# Patient Record
Sex: MALE | Race: OTHER | HISPANIC OR LATINO | Employment: UNEMPLOYED | URBAN - METROPOLITAN AREA
[De-identification: names, ages, dates, MRNs, and addresses within clinical notes are randomized per-mention and may not be internally consistent; named-entity substitution may affect disease eponyms.]

---

## 2020-12-15 ENCOUNTER — TELEPHONE (OUTPATIENT)
Dept: FAMILY MEDICINE | Facility: CLINIC | Age: 65
End: 2020-12-15

## 2020-12-15 ENCOUNTER — OFFICE VISIT (OUTPATIENT)
Dept: FAMILY MEDICINE | Facility: CLINIC | Age: 65
End: 2020-12-15
Payer: MEDICARE

## 2020-12-15 VITALS
OXYGEN SATURATION: 98 % | WEIGHT: 166 LBS | HEIGHT: 66 IN | TEMPERATURE: 98 F | DIASTOLIC BLOOD PRESSURE: 80 MMHG | BODY MASS INDEX: 26.68 KG/M2 | HEART RATE: 83 BPM | SYSTOLIC BLOOD PRESSURE: 140 MMHG

## 2020-12-15 DIAGNOSIS — Z12.5 PROSTATE CANCER SCREENING: ICD-10-CM

## 2020-12-15 DIAGNOSIS — E03.9 ACQUIRED HYPOTHYROIDISM: ICD-10-CM

## 2020-12-15 DIAGNOSIS — M25.50 POLYARTHRALGIA: ICD-10-CM

## 2020-12-15 DIAGNOSIS — Z11.4 ENCOUNTER FOR SCREENING FOR HUMAN IMMUNODEFICIENCY VIRUS (HIV): ICD-10-CM

## 2020-12-15 DIAGNOSIS — E78.5 HYPERLIPIDEMIA, UNSPECIFIED HYPERLIPIDEMIA TYPE: ICD-10-CM

## 2020-12-15 DIAGNOSIS — Z00.00 ANNUAL PHYSICAL EXAM: Primary | ICD-10-CM

## 2020-12-15 DIAGNOSIS — Z11.59 ENCOUNTER FOR HEPATITIS C SCREENING TEST FOR LOW RISK PATIENT: ICD-10-CM

## 2020-12-15 DIAGNOSIS — K90.49 MALABSORPTION DUE TO INTOLERANCE, NOT ELSEWHERE CLASSIFIED: ICD-10-CM

## 2020-12-15 DIAGNOSIS — Z12.11 COLON CANCER SCREENING: ICD-10-CM

## 2020-12-15 PROCEDURE — 99999 PR PBB SHADOW E&M-NEW PATIENT-LVL IV: CPT | Mod: PBBFAC,,, | Performed by: INTERNAL MEDICINE

## 2020-12-15 PROCEDURE — 99999 PR PBB SHADOW E&M-NEW PATIENT-LVL IV: ICD-10-PCS | Mod: PBBFAC,,, | Performed by: INTERNAL MEDICINE

## 2020-12-15 PROCEDURE — 99204 OFFICE O/P NEW MOD 45 MIN: CPT | Mod: PBBFAC,PO | Performed by: INTERNAL MEDICINE

## 2020-12-15 PROCEDURE — 99204 PR OFFICE/OUTPT VISIT, NEW, LEVL IV, 45-59 MIN: ICD-10-PCS | Mod: S$PBB,,, | Performed by: INTERNAL MEDICINE

## 2020-12-15 PROCEDURE — 99204 OFFICE O/P NEW MOD 45 MIN: CPT | Mod: S$PBB,,, | Performed by: INTERNAL MEDICINE

## 2020-12-15 NOTE — PROGRESS NOTES
Subjective:       Patient ID: Shaka Hatch is a 65 y.o. male.    Chief Complaint: Establish Care      65-year-old man with history of hypercholesterolemia, hypothyroidism, and degenerative disc disease.  These problems were diagnosed in [facility] a couple of years ago and treatment was prescribed once and he understood that problems will be normalize and no longer needed to keep.  He is here to get established and have routine/annual medical evaluation.  Reports occasional back pains that are very short-lived and does not take anything for his pain.  Also notices frequent swelling of both antecubital and popliteal area with exercise, now normal.  Denies pain, weakness, redness, or tingling.  And also reports right knee shock-like pain on the lateral side of his knee are below-the-knee associated with some movements.  It just lasts seconds and does not take anything for the pain.  Denies swelling, weakness, or any other symptom.    He walks a few times a week for around 40 min without limitations.  Not really following a diet.  No toxic habits who but quit smoking 3 years ago.  Drinks 1 or 2 beers or wine 5 times a week.    Review of Systems   Constitutional: Negative for activity change, appetite change, chills, fatigue, fever and unexpected weight change.   HENT: Negative for nasal congestion, ear pain and sore throat.    Eyes: Negative for visual disturbance.   Respiratory: Negative for cough and shortness of breath.    Cardiovascular: Negative for chest pain, palpitations, leg swelling and claudication.   Gastrointestinal: Negative for abdominal pain, change in bowel habit, constipation, diarrhea, nausea, vomiting and change in bowel habit.   Endocrine: Negative for cold intolerance, heat intolerance, polyphagia and polyuria.   Genitourinary: Negative for bladder incontinence, difficulty urinating and dysuria.   Musculoskeletal: Positive for arthralgias (See above) and joint swelling (Reports antecubital and  popliteal area swelling with exercise.).   Neurological: Negative for dizziness, weakness, light-headedness, numbness and headaches.   Hematological: Does not bruise/bleed easily.   Psychiatric/Behavioral: Negative for sleep disturbance. The patient is not nervous/anxious.       Past Medical History:   Diagnosis Date    Hyperlipidemia     Thyroid disease        No past surgical history on file.    Family History   Problem Relation Age of Onset    Diabetes Mellitus Mother     Hypertension Mother     Kidney cancer Mother     No Known Problems Father     Thyroid disease Sister     Chronic back pain Brother     Heart disease Maternal Grandmother        Social History     Socioeconomic History    Marital status: Single     Spouse name: Not on file    Number of children: Not on file    Years of education: Not on file    Highest education level: Not on file   Occupational History    Not on file   Social Needs    Financial resource strain: Not on file    Food insecurity     Worry: Not on file     Inability: Not on file    Transportation needs     Medical: Not on file     Non-medical: Not on file   Tobacco Use    Smoking status: Never Smoker   Substance and Sexual Activity    Alcohol use: Not on file    Drug use: Not on file    Sexual activity: Not on file   Lifestyle    Physical activity     Days per week: Not on file     Minutes per session: Not on file    Stress: Not on file   Relationships    Social connections     Talks on phone: Not on file     Gets together: Not on file     Attends Mu-ism service: Not on file     Active member of club or organization: Not on file     Attends meetings of clubs or organizations: Not on file     Relationship status: Not on file   Other Topics Concern    Not on file   Social History Narrative    Not on file       No current outpatient medications on file.     No current facility-administered medications for this visit.        Review of patient's allergies  "indicates:  No Known Allergies      Objective:       Last 3 sets of Vitals    Vitals - 1 value per visit 12/15/2020   SYSTOLIC 140   DIASTOLIC 80   PULSE 83   TEMPERATURE 98.1   SPO2 98   Weight (lb) 166.01   Weight (kg) 75.3   HEIGHT 5' 6"   BODY MASS INDEX 26.79   VISIT REPORT -   Pain Score  0   Physical Exam  Constitutional:       General: He is not in acute distress.     Appearance: Normal appearance. He is normal weight.   HENT:      Head: Normocephalic.      Right Ear: Tympanic membrane, ear canal and external ear normal.      Left Ear: Tympanic membrane, ear canal and external ear normal.      Nose: Nose normal.      Mouth/Throat:      Mouth: Mucous membranes are moist.      Pharynx: Oropharynx is clear.   Eyes:      General: No scleral icterus.     Extraocular Movements: Extraocular movements intact.      Conjunctiva/sclera: Conjunctivae normal.      Pupils: Pupils are equal, round, and reactive to light.   Neck:      Musculoskeletal: Neck supple.      Vascular: No carotid bruit.   Cardiovascular:      Rate and Rhythm: Normal rate and regular rhythm.      Pulses: Normal pulses.      Heart sounds: Normal heart sounds.   Pulmonary:      Effort: Pulmonary effort is normal.      Breath sounds: Normal breath sounds.   Abdominal:      General: Bowel sounds are normal. There is no distension.      Palpations: Abdomen is soft. There is no mass.      Tenderness: There is no abdominal tenderness.   Musculoskeletal: Normal range of motion.         General: No swelling.   Lymphadenopathy:      Cervical: No cervical adenopathy.   Skin:     General: Skin is warm.   Neurological:      General: No focal deficit present.      Mental Status: He is alert and oriented to person, place, and time.   Psychiatric:         Mood and Affect: Mood normal.         Behavior: Behavior normal.               Assessment:       1. Annual physical exam    2. Polyarthralgia    3. Hyperlipidemia, unspecified hyperlipidemia type    4. Acquired " hypothyroidism    5. Encounter for hepatitis C screening test for low risk patient    6. Encounter for screening for human immunodeficiency virus (HIV)    7. Malabsorption due to intolerance, not elsewhere classified     8. Colon cancer screening    9. Prostate cancer screening        Plan:       Shaka was seen today for establish care.    Diagnoses and all orders for this visit:    Annual physical exam  -     Stable exam.  - CBC Auto Differential; Future  -     Comprehensive Metabolic Panel; Future  -     Vitamin D; Future  -     PSA, Screening; Future    Polyarthralgia  -     CBC Auto Differential; Future  -     C-Reactive Protein; Future to evaluate complains of swelling of antecubital or popliteal area but swelling possibly associated to muscle or tendon prominence.  - Treat with Tylenol, Voltaren ointment, or local measures.    Hyperlipidemia, unspecified hyperlipidemia type  -     Lipid Panel; Future  -     TSH; Standing    Acquired hypothyroidism  -     TSH; Standing  - no goiter, clinically euthyroid    Encounter for hepatitis C screening test for low risk patient  -     HIV 1/2 Ag/Ab (4th Gen); Future    Encounter for screening for human immunodeficiency virus (HIV)  -     Hepatitis C Antibody; Future    Malabsorption due to intolerance, not elsewhere classified   -     Vitamin D; Future    Colon cancer screening  -     Ambulatory referral/consult to Gastroenterology; Future    Prostate cancer screening  -     PSA, Screening; Future

## 2020-12-16 ENCOUNTER — TELEPHONE (OUTPATIENT)
Dept: FAMILY MEDICINE | Facility: CLINIC | Age: 65
End: 2020-12-16

## 2020-12-16 NOTE — TELEPHONE ENCOUNTER
Spoke with pt. Informed him he can have his labs done this week. Patient will have labs done on 12/17/2020

## 2020-12-17 ENCOUNTER — LAB VISIT (OUTPATIENT)
Dept: LAB | Facility: HOSPITAL | Age: 65
End: 2020-12-17
Attending: INTERNAL MEDICINE
Payer: MEDICARE

## 2020-12-17 DIAGNOSIS — Z00.00 ANNUAL PHYSICAL EXAM: ICD-10-CM

## 2020-12-17 DIAGNOSIS — Z11.59 ENCOUNTER FOR HEPATITIS C SCREENING TEST FOR LOW RISK PATIENT: ICD-10-CM

## 2020-12-17 DIAGNOSIS — Z12.5 PROSTATE CANCER SCREENING: ICD-10-CM

## 2020-12-17 DIAGNOSIS — E78.5 HYPERLIPIDEMIA, UNSPECIFIED HYPERLIPIDEMIA TYPE: ICD-10-CM

## 2020-12-17 DIAGNOSIS — M25.50 POLYARTHRALGIA: ICD-10-CM

## 2020-12-17 DIAGNOSIS — K90.49 MALABSORPTION DUE TO INTOLERANCE, NOT ELSEWHERE CLASSIFIED: ICD-10-CM

## 2020-12-17 DIAGNOSIS — E03.9 ACQUIRED HYPOTHYROIDISM: ICD-10-CM

## 2020-12-17 DIAGNOSIS — Z11.4 ENCOUNTER FOR SCREENING FOR HUMAN IMMUNODEFICIENCY VIRUS (HIV): ICD-10-CM

## 2020-12-17 LAB
25(OH)D3+25(OH)D2 SERPL-MCNC: 16 NG/ML (ref 30–96)
ALBUMIN SERPL BCP-MCNC: 4.3 G/DL (ref 3.5–5.2)
ALP SERPL-CCNC: 61 U/L (ref 55–135)
ALT SERPL W/O P-5'-P-CCNC: 23 U/L (ref 10–44)
ANION GAP SERPL CALC-SCNC: 8 MMOL/L (ref 8–16)
AST SERPL-CCNC: 25 U/L (ref 10–40)
BASOPHILS # BLD AUTO: 0.06 K/UL (ref 0–0.2)
BASOPHILS NFR BLD: 1 % (ref 0–1.9)
BILIRUB SERPL-MCNC: 0.6 MG/DL (ref 0.1–1)
BUN SERPL-MCNC: 9 MG/DL (ref 8–23)
CALCIUM SERPL-MCNC: 9.3 MG/DL (ref 8.7–10.5)
CHLORIDE SERPL-SCNC: 102 MMOL/L (ref 95–110)
CHOLEST SERPL-MCNC: 282 MG/DL (ref 120–199)
CHOLEST/HDLC SERPL: 4.3 {RATIO} (ref 2–5)
CO2 SERPL-SCNC: 30 MMOL/L (ref 23–29)
COMPLEXED PSA SERPL-MCNC: 2.2 NG/ML (ref 0–4)
CREAT SERPL-MCNC: 0.8 MG/DL (ref 0.5–1.4)
CRP SERPL-MCNC: 2.2 MG/L (ref 0–8.2)
DIFFERENTIAL METHOD: ABNORMAL
EOSINOPHIL # BLD AUTO: 0.1 K/UL (ref 0–0.5)
EOSINOPHIL NFR BLD: 1.9 % (ref 0–8)
ERYTHROCYTE [DISTWIDTH] IN BLOOD BY AUTOMATED COUNT: 13.1 % (ref 11.5–14.5)
EST. GFR  (AFRICAN AMERICAN): >60 ML/MIN/1.73 M^2
EST. GFR  (NON AFRICAN AMERICAN): >60 ML/MIN/1.73 M^2
GLUCOSE SERPL-MCNC: 103 MG/DL (ref 70–110)
HCT VFR BLD AUTO: 45.5 % (ref 40–54)
HDLC SERPL-MCNC: 65 MG/DL (ref 40–75)
HDLC SERPL: 23 % (ref 20–50)
HGB BLD-MCNC: 15.1 G/DL (ref 14–18)
IMM GRANULOCYTES # BLD AUTO: 0.01 K/UL (ref 0–0.04)
IMM GRANULOCYTES NFR BLD AUTO: 0.2 % (ref 0–0.5)
LDLC SERPL CALC-MCNC: 174.4 MG/DL (ref 63–159)
LYMPHOCYTES # BLD AUTO: 2.1 K/UL (ref 1–4.8)
LYMPHOCYTES NFR BLD: 35.9 % (ref 18–48)
MCH RBC QN AUTO: 31.4 PG (ref 27–31)
MCHC RBC AUTO-ENTMCNC: 33.2 G/DL (ref 32–36)
MCV RBC AUTO: 95 FL (ref 82–98)
MONOCYTES # BLD AUTO: 0.7 K/UL (ref 0.3–1)
MONOCYTES NFR BLD: 11.2 % (ref 4–15)
NEUTROPHILS # BLD AUTO: 2.9 K/UL (ref 1.8–7.7)
NEUTROPHILS NFR BLD: 49.8 % (ref 38–73)
NONHDLC SERPL-MCNC: 217 MG/DL
NRBC BLD-RTO: 0 /100 WBC
PLATELET # BLD AUTO: 291 K/UL (ref 150–350)
PMV BLD AUTO: 9.8 FL (ref 9.2–12.9)
POTASSIUM SERPL-SCNC: 3.8 MMOL/L (ref 3.5–5.1)
PROT SERPL-MCNC: 7.5 G/DL (ref 6–8.4)
RBC # BLD AUTO: 4.81 M/UL (ref 4.6–6.2)
SODIUM SERPL-SCNC: 140 MMOL/L (ref 136–145)
T4 FREE SERPL-MCNC: 0.94 NG/DL (ref 0.71–1.51)
TRIGL SERPL-MCNC: 213 MG/DL (ref 30–150)
TSH SERPL DL<=0.005 MIU/L-ACNC: 7.21 UIU/ML (ref 0.4–4)
WBC # BLD AUTO: 5.79 K/UL (ref 3.9–12.7)

## 2020-12-17 PROCEDURE — 86140 C-REACTIVE PROTEIN: CPT

## 2020-12-17 PROCEDURE — 84439 ASSAY OF FREE THYROXINE: CPT

## 2020-12-17 PROCEDURE — 85025 COMPLETE CBC W/AUTO DIFF WBC: CPT

## 2020-12-17 PROCEDURE — 86803 HEPATITIS C AB TEST: CPT

## 2020-12-17 PROCEDURE — 84443 ASSAY THYROID STIM HORMONE: CPT

## 2020-12-17 PROCEDURE — 82306 VITAMIN D 25 HYDROXY: CPT

## 2020-12-17 PROCEDURE — 84153 ASSAY OF PSA TOTAL: CPT

## 2020-12-17 PROCEDURE — 86703 HIV-1/HIV-2 1 RESULT ANTBDY: CPT

## 2020-12-17 PROCEDURE — 80053 COMPREHEN METABOLIC PANEL: CPT

## 2020-12-17 PROCEDURE — 36415 COLL VENOUS BLD VENIPUNCTURE: CPT

## 2020-12-17 PROCEDURE — 80061 LIPID PANEL: CPT

## 2020-12-18 LAB
HCV AB SERPL QL IA: NEGATIVE
HIV 1+2 AB+HIV1 P24 AG SERPL QL IA: NEGATIVE

## 2020-12-23 ENCOUNTER — TELEPHONE (OUTPATIENT)
Dept: FAMILY MEDICINE | Facility: CLINIC | Age: 65
End: 2020-12-23

## 2020-12-23 NOTE — TELEPHONE ENCOUNTER
Informed patient regarding his abnormal result to arrange appointment to go over results. Pt scheduled an appt with Dr. Jurado to review his test results.

## 2020-12-23 NOTE — TELEPHONE ENCOUNTER
----- Message from Valencia Jurado MD sent at 12/22/2020 12:37 AM CST -----  Please call the patient regarding his abnormal result to arrange appointment to go over results.

## 2020-12-29 ENCOUNTER — OFFICE VISIT (OUTPATIENT)
Dept: FAMILY MEDICINE | Facility: CLINIC | Age: 65
End: 2020-12-29
Payer: MEDICARE

## 2020-12-29 VITALS
TEMPERATURE: 98 F | WEIGHT: 168.44 LBS | HEART RATE: 74 BPM | BODY MASS INDEX: 27.07 KG/M2 | HEIGHT: 66 IN | OXYGEN SATURATION: 98 % | SYSTOLIC BLOOD PRESSURE: 122 MMHG | DIASTOLIC BLOOD PRESSURE: 84 MMHG

## 2020-12-29 DIAGNOSIS — E55.9 VITAMIN D DEFICIENCY: ICD-10-CM

## 2020-12-29 DIAGNOSIS — R73.9 HYPERGLYCEMIA: ICD-10-CM

## 2020-12-29 DIAGNOSIS — Z12.11 COLON CANCER SCREENING: ICD-10-CM

## 2020-12-29 DIAGNOSIS — E78.5 HYPERLIPIDEMIA, UNSPECIFIED HYPERLIPIDEMIA TYPE: Primary | ICD-10-CM

## 2020-12-29 PROCEDURE — 99213 OFFICE O/P EST LOW 20 MIN: CPT | Mod: PBBFAC,PO | Performed by: INTERNAL MEDICINE

## 2020-12-29 PROCEDURE — 99214 PR OFFICE/OUTPT VISIT, EST, LEVL IV, 30-39 MIN: ICD-10-PCS | Mod: S$PBB,,, | Performed by: INTERNAL MEDICINE

## 2020-12-29 PROCEDURE — 99999 PR PBB SHADOW E&M-EST. PATIENT-LVL III: CPT | Mod: PBBFAC,,, | Performed by: INTERNAL MEDICINE

## 2020-12-29 PROCEDURE — 99214 OFFICE O/P EST MOD 30 MIN: CPT | Mod: S$PBB,,, | Performed by: INTERNAL MEDICINE

## 2020-12-29 PROCEDURE — 99999 PR PBB SHADOW E&M-EST. PATIENT-LVL III: ICD-10-PCS | Mod: PBBFAC,,, | Performed by: INTERNAL MEDICINE

## 2020-12-29 RX ORDER — VIT C/E/ZN/COPPR/LUTEIN/ZEAXAN 250MG-90MG
1000 CAPSULE ORAL DAILY
Qty: 30 CAPSULE | Refills: 3 | Status: SHIPPED | OUTPATIENT
Start: 2020-12-29 | End: 2023-10-12

## 2020-12-29 RX ORDER — ATORVASTATIN CALCIUM 10 MG/1
10 TABLET, FILM COATED ORAL DAILY
Qty: 90 TABLET | Refills: 3 | Status: SHIPPED | OUTPATIENT
Start: 2020-12-29 | End: 2021-06-15 | Stop reason: SDUPTHER

## 2020-12-29 RX ORDER — KETOCONAZOLE 20 MG/G
CREAM TOPICAL DAILY
Qty: 30 G | Refills: 3 | Status: SHIPPED | OUTPATIENT
Start: 2020-12-29 | End: 2021-06-15 | Stop reason: SDUPTHER

## 2020-12-29 NOTE — PROGRESS NOTES
Subjective:       Patient ID: Shaka Hatch is a 65 y.o. male.    Chief Complaint: Results      Mr. Hatch is here for follow up. Reports has been feeling ok. His labs showed increased cholesterol and LDL which he mentions has been treated before. He feels could change his diet but in view he was treated before agrees to start a low dose Lipitor. His glucose level is mildly elevated but no polydipsia, polyuria, or other symptom to suggest prediabetes.     His TSH has been elevated before but not enough for treatment. He denies, fatigue, cold intolerance, or weight gain.     His PSA increased but still within normal limits. Reports some dribling (present for a long time) but no trouble urinating or nocturia.    Review of Systems   All other systems reviewed and are negative.     Past Medical History:   Diagnosis Date    Hyperlipidemia     Thyroid disease        No past surgical history on file.    Family History   Problem Relation Age of Onset    Diabetes Mellitus Mother     Hypertension Mother     Kidney cancer Mother     No Known Problems Father     Thyroid disease Sister     Chronic back pain Brother     Heart disease Maternal Grandmother        Social History     Socioeconomic History    Marital status: Single     Spouse name: Not on file    Number of children: Not on file    Years of education: Not on file    Highest education level: Not on file   Occupational History    Not on file   Social Needs    Financial resource strain: Not on file    Food insecurity     Worry: Not on file     Inability: Not on file    Transportation needs     Medical: Not on file     Non-medical: Not on file   Tobacco Use    Smoking status: Never Smoker   Substance and Sexual Activity    Alcohol use: Not on file    Drug use: Not on file    Sexual activity: Not on file   Lifestyle    Physical activity     Days per week: Not on file     Minutes per session: Not on file    Stress: Not on file   Relationships     "Social connections     Talks on phone: Not on file     Gets together: Not on file     Attends Gnosticist service: Not on file     Active member of club or organization: Not on file     Attends meetings of clubs or organizations: Not on file     Relationship status: Not on file   Other Topics Concern    Not on file   Social History Narrative    Not on file       Current Outpatient Medications   Medication Sig Dispense Refill    atorvastatin (LIPITOR) 10 MG tablet Take 1 tablet (10 mg total) by mouth once daily. 90 tablet 3    cholecalciferol, vitamin D3, (VITAMIN D3) 25 mcg (1,000 unit) capsule Take 1 capsule (1,000 Units total) by mouth once daily. 30 capsule 3    ketoconazole (NIZORAL) 2 % cream Apply topically once daily. 30 g 3     No current facility-administered medications for this visit.        Review of patient's allergies indicates:  No Known Allergies      Objective:       Last 3 sets of Vitals    Vitals - 1 value per visit 12/15/2020 12/29/2020   SYSTOLIC 140 122   DIASTOLIC 80 84   PULSE 83 74   TEMPERATURE 98.1 98.1   SPO2 98 98   Weight (lb) 166.01 168.43   Weight (kg) 75.3 76.4   HEIGHT 5' 6" 5' 6"   BODY MASS INDEX 26.79 27.19   VISIT REPORT - -   Pain Score  0 0   Physical Exam  Constitutional:       General: He is not in acute distress.     Appearance: Normal appearance.   HENT:      Head: Normocephalic.      Nose: Nose normal.      Mouth/Throat:      Mouth: Mucous membranes are moist.   Eyes:      General: No scleral icterus.     Extraocular Movements: Extraocular movements intact.      Conjunctiva/sclera: Conjunctivae normal.      Pupils: Pupils are equal, round, and reactive to light.   Neck:      Musculoskeletal: Neck supple.      Vascular: No carotid bruit.   Cardiovascular:      Rate and Rhythm: Normal rate and regular rhythm.      Pulses: Normal pulses.      Heart sounds: Normal heart sounds.   Pulmonary:      Effort: Pulmonary effort is normal.      Breath sounds: Normal breath sounds. "   Abdominal:      General: Bowel sounds are normal. There is no distension.      Palpations: Abdomen is soft.   Musculoskeletal: Normal range of motion.         General: No swelling.   Lymphadenopathy:      Cervical: No cervical adenopathy.   Skin:     General: Skin is warm.   Neurological:      General: No focal deficit present.      Mental Status: He is alert and oriented to person, place, and time.   Psychiatric:         Mood and Affect: Mood normal.         Behavior: Behavior normal.           CBC:  Recent Labs   Lab Result Units 12/17/20  0845   WBC K/uL 5.79   RBC M/uL 4.81   Hemoglobin g/dL 15.1   Hematocrit % 45.5   Platelets K/uL 291   MCV fL 95   MCH pg 31.4*   MCHC g/dL 33.2     CMP:  Recent Labs   Lab Result Units 12/17/20  0845   Glucose mg/dL 103   Calcium mg/dL 9.3   Albumin g/dL 4.3   Total Protein g/dL 7.5   Sodium mmol/L 140   Potassium mmol/L 3.8   CO2 mmol/L 30*   Chloride mmol/L 102   BUN mg/dL 9   Alkaline Phosphatase U/L 61   ALT U/L 23   AST U/L 25   Total Bilirubin mg/dL 0.6     URINALYSIS:  No results for input(s): COLORU, CLARITYU, SPECGRAV, PHUR, PROTEINUA, GLUCOSEU, BILIRUBINCON, BLOODU, WBCU, RBCU, BACTERIA, MUCUS, NITRITE, LEUKOCYTESUR, UROBILINOGEN, HYALINECASTS in the last 2160 hours.   LIPIDS:  Recent Labs   Lab Result Units 12/17/20  0845   TSH uIU/mL 7.213*   HDL mg/dL 65   Cholesterol mg/dL 282*   Triglycerides mg/dL 213*   LDL Cholesterol mg/dL 174.4*   HDL/Cholesterol Ratio % 23.0   Non-HDL Cholesterol mg/dL 217   Total Cholesterol/HDL Ratio  4.3     TSH:  Recent Labs   Lab Result Units 12/17/20  0845   TSH uIU/mL 7.213*       A1C:            Assessment:       1. Hyperlipidemia, unspecified hyperlipidemia type    2. Hyperglycemia    3. Colon cancer screening    4. Vitamin D deficiency        Plan:       Shaka was seen today for results.    Diagnoses and all orders for this visit:    Hyperlipidemia, unspecified hyperlipidemia type  -     Lipid Panel; Future  -     Comprehensive  Metabolic Panel; Future  -     CK; Future  -  Lipitor 10 mg daily    Hyperglycemia  -     Hemoglobin A1C; Future  -     Comprehensive Metabolic Panel; Future  - Diet and exercise.    Colon cancer screening  -     Case Request Endoscopy: COLONOSCOPY    Vitamin D deficiency   - Supplements recommended.    PSA noted and will repeat in 1 year. Patient will notify if any symptom of enlarged prostate.    Other orders  -     atorvastatin (LIPITOR) 10 MG tablet; Take 1 tablet (10 mg total) by mouth once daily.  -     ketoconazole (NIZORAL) 2 % cream; Apply topically once daily.  -     cholecalciferol, vitamin D3, (VITAMIN D3) 25 mcg (1,000 unit) capsule; Take 1 capsule (1,000 Units total) by mouth once daily.

## 2021-01-25 DIAGNOSIS — Z01.812 PRE-PROCEDURE LAB EXAM: ICD-10-CM

## 2021-01-25 RX ORDER — SODIUM, POTASSIUM,MAG SULFATES 17.5-3.13G
1 SOLUTION, RECONSTITUTED, ORAL ORAL DAILY
Qty: 1 KIT | Refills: 0 | Status: SHIPPED | OUTPATIENT
Start: 2021-01-25 | End: 2021-01-27

## 2021-02-11 ENCOUNTER — TELEPHONE (OUTPATIENT)
Dept: ENDOSCOPY | Facility: HOSPITAL | Age: 66
End: 2021-02-11

## 2021-02-12 ENCOUNTER — LAB VISIT (OUTPATIENT)
Dept: FAMILY MEDICINE | Facility: CLINIC | Age: 66
End: 2021-02-12
Payer: MEDICARE

## 2021-02-12 DIAGNOSIS — Z01.812 PRE-PROCEDURE LAB EXAM: ICD-10-CM

## 2021-02-12 PROCEDURE — U0003 INFECTIOUS AGENT DETECTION BY NUCLEIC ACID (DNA OR RNA); SEVERE ACUTE RESPIRATORY SYNDROME CORONAVIRUS 2 (SARS-COV-2) (CORONAVIRUS DISEASE [COVID-19]), AMPLIFIED PROBE TECHNIQUE, MAKING USE OF HIGH THROUGHPUT TECHNOLOGIES AS DESCRIBED BY CMS-2020-01-R: HCPCS

## 2021-02-12 PROCEDURE — U0005 INFEC AGEN DETEC AMPLI PROBE: HCPCS

## 2021-02-13 ENCOUNTER — ANESTHESIA EVENT (OUTPATIENT)
Dept: ENDOSCOPY | Facility: HOSPITAL | Age: 66
End: 2021-02-13
Payer: MEDICARE

## 2021-02-13 LAB — SARS-COV-2 RNA RESP QL NAA+PROBE: NOT DETECTED

## 2021-02-15 ENCOUNTER — TELEPHONE (OUTPATIENT)
Dept: GASTROENTEROLOGY | Facility: CLINIC | Age: 66
End: 2021-02-15

## 2021-02-15 ENCOUNTER — ANESTHESIA (OUTPATIENT)
Dept: ENDOSCOPY | Facility: HOSPITAL | Age: 66
End: 2021-02-15
Payer: MEDICARE

## 2021-02-15 DIAGNOSIS — Z01.812 PRE-PROCEDURE LAB EXAM: ICD-10-CM

## 2021-02-28 ENCOUNTER — LAB VISIT (OUTPATIENT)
Dept: URGENT CARE | Facility: CLINIC | Age: 66
End: 2021-02-28
Payer: MEDICARE

## 2021-02-28 DIAGNOSIS — Z01.812 PRE-PROCEDURE LAB EXAM: ICD-10-CM

## 2021-02-28 PROCEDURE — U0003 INFECTIOUS AGENT DETECTION BY NUCLEIC ACID (DNA OR RNA); SEVERE ACUTE RESPIRATORY SYNDROME CORONAVIRUS 2 (SARS-COV-2) (CORONAVIRUS DISEASE [COVID-19]), AMPLIFIED PROBE TECHNIQUE, MAKING USE OF HIGH THROUGHPUT TECHNOLOGIES AS DESCRIBED BY CMS-2020-01-R: HCPCS

## 2021-02-28 PROCEDURE — U0005 INFEC AGEN DETEC AMPLI PROBE: HCPCS

## 2021-03-01 ENCOUNTER — TELEPHONE (OUTPATIENT)
Dept: ENDOSCOPY | Facility: HOSPITAL | Age: 66
End: 2021-03-01

## 2021-03-01 LAB — SARS-COV-2 RNA RESP QL NAA+PROBE: NOT DETECTED

## 2021-03-03 ENCOUNTER — HOSPITAL ENCOUNTER (OUTPATIENT)
Facility: HOSPITAL | Age: 66
Discharge: HOME | End: 2021-03-03
Attending: INTERNAL MEDICINE | Admitting: INTERNAL MEDICINE
Payer: MEDICARE

## 2021-03-03 ENCOUNTER — TELEPHONE (OUTPATIENT)
Dept: SURGERY | Facility: CLINIC | Age: 66
End: 2021-03-03

## 2021-03-03 ENCOUNTER — TELEPHONE (OUTPATIENT)
Dept: GASTROENTEROLOGY | Facility: CLINIC | Age: 66
End: 2021-03-03

## 2021-03-03 VITALS
BODY MASS INDEX: 25.71 KG/M2 | SYSTOLIC BLOOD PRESSURE: 115 MMHG | RESPIRATION RATE: 18 BRPM | TEMPERATURE: 98 F | WEIGHT: 160 LBS | HEART RATE: 62 BPM | HEIGHT: 66 IN | OXYGEN SATURATION: 100 % | DIASTOLIC BLOOD PRESSURE: 77 MMHG

## 2021-03-03 DIAGNOSIS — Z12.11 SCREEN FOR COLON CANCER: ICD-10-CM

## 2021-03-03 PROCEDURE — 27201089 HC SNARE, DISP (ANY): Performed by: INTERNAL MEDICINE

## 2021-03-03 PROCEDURE — 27201012 HC FORCEPS, HOT/COLD, DISP: Performed by: INTERNAL MEDICINE

## 2021-03-03 PROCEDURE — 63600175 PHARM REV CODE 636 W HCPCS: Performed by: NURSE ANESTHETIST, CERTIFIED REGISTERED

## 2021-03-03 PROCEDURE — 25000003 PHARM REV CODE 250: Performed by: INTERNAL MEDICINE

## 2021-03-03 PROCEDURE — 25000003 PHARM REV CODE 250: Performed by: NURSE ANESTHETIST, CERTIFIED REGISTERED

## 2021-03-03 PROCEDURE — 45385 COLONOSCOPY W/LESION REMOVAL: CPT | Mod: PT,,, | Performed by: INTERNAL MEDICINE

## 2021-03-03 PROCEDURE — 88305 TISSUE EXAM BY PATHOLOGIST: ICD-10-PCS | Mod: 26,,, | Performed by: PATHOLOGY

## 2021-03-03 PROCEDURE — 37000009 HC ANESTHESIA EA ADD 15 MINS: Performed by: INTERNAL MEDICINE

## 2021-03-03 PROCEDURE — 45385 COLONOSCOPY W/LESION REMOVAL: CPT | Performed by: INTERNAL MEDICINE

## 2021-03-03 PROCEDURE — 37000008 HC ANESTHESIA 1ST 15 MINUTES: Performed by: INTERNAL MEDICINE

## 2021-03-03 PROCEDURE — 88305 TISSUE EXAM BY PATHOLOGIST: CPT | Mod: 59 | Performed by: PATHOLOGY

## 2021-03-03 PROCEDURE — 45380 PR COLONOSCOPY,BIOPSY: ICD-10-PCS | Mod: 59,,, | Performed by: INTERNAL MEDICINE

## 2021-03-03 PROCEDURE — 45380 COLONOSCOPY AND BIOPSY: CPT | Performed by: INTERNAL MEDICINE

## 2021-03-03 PROCEDURE — 45380 COLONOSCOPY AND BIOPSY: CPT | Mod: 59,,, | Performed by: INTERNAL MEDICINE

## 2021-03-03 PROCEDURE — 88305 TISSUE EXAM BY PATHOLOGIST: CPT | Mod: 26,,, | Performed by: PATHOLOGY

## 2021-03-03 PROCEDURE — 45385 PR COLONOSCOPY,REMV LESN,SNARE: ICD-10-PCS | Mod: PT,,, | Performed by: INTERNAL MEDICINE

## 2021-03-03 RX ORDER — PROPOFOL 10 MG/ML
VIAL (ML) INTRAVENOUS CONTINUOUS PRN
Status: DISCONTINUED | OUTPATIENT
Start: 2021-03-03 | End: 2021-03-03

## 2021-03-03 RX ORDER — PROPOFOL 10 MG/ML
VIAL (ML) INTRAVENOUS
Status: DISCONTINUED | OUTPATIENT
Start: 2021-03-03 | End: 2021-03-03

## 2021-03-03 RX ORDER — SODIUM CHLORIDE 9 MG/ML
INJECTION, SOLUTION INTRAVENOUS CONTINUOUS
Status: ACTIVE | OUTPATIENT
Start: 2021-03-03

## 2021-03-03 RX ORDER — LIDOCAINE HYDROCHLORIDE 20 MG/ML
INJECTION INTRAVENOUS
Status: DISCONTINUED | OUTPATIENT
Start: 2021-03-03 | End: 2021-03-03

## 2021-03-03 RX ORDER — SODIUM CHLORIDE 0.9 % (FLUSH) 0.9 %
10 SYRINGE (ML) INJECTION
Status: ACTIVE | OUTPATIENT
Start: 2021-03-03

## 2021-03-03 RX ADMIN — PROPOFOL 100 MG: 10 INJECTION, EMULSION INTRAVENOUS at 12:03

## 2021-03-03 RX ADMIN — SODIUM CHLORIDE: 0.9 INJECTION, SOLUTION INTRAVENOUS at 12:03

## 2021-03-03 RX ADMIN — PROPOFOL 150 MCG/KG/MIN: 10 INJECTION, EMULSION INTRAVENOUS at 12:03

## 2021-03-03 RX ADMIN — LIDOCAINE HYDROCHLORIDE 100 MG: 20 INJECTION, SOLUTION INTRAVENOUS at 12:03

## 2021-03-04 ENCOUNTER — TELEPHONE (OUTPATIENT)
Dept: ENDOSCOPY | Facility: HOSPITAL | Age: 66
End: 2021-03-04

## 2021-03-09 LAB
COMMENT: NORMAL
FINAL PATHOLOGIC DIAGNOSIS: NORMAL
GROSS: NORMAL
Lab: NORMAL

## 2021-03-18 ENCOUNTER — PATIENT OUTREACH (OUTPATIENT)
Dept: ADMINISTRATIVE | Facility: OTHER | Age: 66
End: 2021-03-18

## 2021-03-22 ENCOUNTER — OFFICE VISIT (OUTPATIENT)
Dept: SURGERY | Facility: CLINIC | Age: 66
End: 2021-03-22
Payer: MEDICARE

## 2021-03-22 VITALS
DIASTOLIC BLOOD PRESSURE: 82 MMHG | WEIGHT: 170.75 LBS | HEART RATE: 79 BPM | SYSTOLIC BLOOD PRESSURE: 151 MMHG | HEIGHT: 66 IN | BODY MASS INDEX: 27.44 KG/M2

## 2021-03-22 DIAGNOSIS — K63.5 SERRATED POLYP OF COLON: Primary | ICD-10-CM

## 2021-03-22 DIAGNOSIS — Z12.11 SCREEN FOR COLON CANCER: ICD-10-CM

## 2021-03-22 PROCEDURE — 3288F FALL RISK ASSESSMENT DOCD: CPT | Mod: CPTII,S$GLB,, | Performed by: STUDENT IN AN ORGANIZED HEALTH CARE EDUCATION/TRAINING PROGRAM

## 2021-03-22 PROCEDURE — 99999 PR PBB SHADOW E&M-EST. PATIENT-LVL III: ICD-10-PCS | Mod: PBBFAC,,, | Performed by: STUDENT IN AN ORGANIZED HEALTH CARE EDUCATION/TRAINING PROGRAM

## 2021-03-22 PROCEDURE — 1101F PT FALLS ASSESS-DOCD LE1/YR: CPT | Mod: CPTII,S$GLB,, | Performed by: STUDENT IN AN ORGANIZED HEALTH CARE EDUCATION/TRAINING PROGRAM

## 2021-03-22 PROCEDURE — 1126F PR PAIN SEVERITY QUANTIFIED, NO PAIN PRESENT: ICD-10-PCS | Mod: S$GLB,,, | Performed by: STUDENT IN AN ORGANIZED HEALTH CARE EDUCATION/TRAINING PROGRAM

## 2021-03-22 PROCEDURE — 3008F BODY MASS INDEX DOCD: CPT | Mod: CPTII,S$GLB,, | Performed by: STUDENT IN AN ORGANIZED HEALTH CARE EDUCATION/TRAINING PROGRAM

## 2021-03-22 PROCEDURE — 3288F PR FALLS RISK ASSESSMENT DOCUMENTED: ICD-10-PCS | Mod: CPTII,S$GLB,, | Performed by: STUDENT IN AN ORGANIZED HEALTH CARE EDUCATION/TRAINING PROGRAM

## 2021-03-22 PROCEDURE — 99204 OFFICE O/P NEW MOD 45 MIN: CPT | Mod: S$GLB,,, | Performed by: STUDENT IN AN ORGANIZED HEALTH CARE EDUCATION/TRAINING PROGRAM

## 2021-03-22 PROCEDURE — 1101F PR PT FALLS ASSESS DOC 0-1 FALLS W/OUT INJ PAST YR: ICD-10-PCS | Mod: CPTII,S$GLB,, | Performed by: STUDENT IN AN ORGANIZED HEALTH CARE EDUCATION/TRAINING PROGRAM

## 2021-03-22 PROCEDURE — 3008F PR BODY MASS INDEX (BMI) DOCUMENTED: ICD-10-PCS | Mod: CPTII,S$GLB,, | Performed by: STUDENT IN AN ORGANIZED HEALTH CARE EDUCATION/TRAINING PROGRAM

## 2021-03-22 PROCEDURE — 99204 PR OFFICE/OUTPT VISIT, NEW, LEVL IV, 45-59 MIN: ICD-10-PCS | Mod: S$GLB,,, | Performed by: STUDENT IN AN ORGANIZED HEALTH CARE EDUCATION/TRAINING PROGRAM

## 2021-03-22 PROCEDURE — 99999 PR PBB SHADOW E&M-EST. PATIENT-LVL III: CPT | Mod: PBBFAC,,, | Performed by: STUDENT IN AN ORGANIZED HEALTH CARE EDUCATION/TRAINING PROGRAM

## 2021-03-22 PROCEDURE — 1126F AMNT PAIN NOTED NONE PRSNT: CPT | Mod: S$GLB,,, | Performed by: STUDENT IN AN ORGANIZED HEALTH CARE EDUCATION/TRAINING PROGRAM

## 2021-03-26 ENCOUNTER — LAB VISIT (OUTPATIENT)
Dept: LAB | Facility: HOSPITAL | Age: 66
End: 2021-03-26
Attending: INTERNAL MEDICINE
Payer: MEDICARE

## 2021-03-26 DIAGNOSIS — R73.9 HYPERGLYCEMIA: ICD-10-CM

## 2021-03-26 DIAGNOSIS — E78.5 HYPERLIPIDEMIA, UNSPECIFIED HYPERLIPIDEMIA TYPE: ICD-10-CM

## 2021-03-26 LAB
ALBUMIN SERPL BCP-MCNC: 4.3 G/DL (ref 3.5–5.2)
ALP SERPL-CCNC: 66 U/L (ref 55–135)
ALT SERPL W/O P-5'-P-CCNC: 27 U/L (ref 10–44)
ANION GAP SERPL CALC-SCNC: 10 MMOL/L (ref 8–16)
AST SERPL-CCNC: 27 U/L (ref 10–40)
BILIRUB SERPL-MCNC: 0.7 MG/DL (ref 0.1–1)
BUN SERPL-MCNC: 11 MG/DL (ref 8–23)
CALCIUM SERPL-MCNC: 9.4 MG/DL (ref 8.7–10.5)
CHLORIDE SERPL-SCNC: 105 MMOL/L (ref 95–110)
CHOLEST SERPL-MCNC: 183 MG/DL (ref 120–199)
CHOLEST/HDLC SERPL: 2.7 {RATIO} (ref 2–5)
CK SERPL-CCNC: 143 U/L (ref 20–200)
CO2 SERPL-SCNC: 26 MMOL/L (ref 23–29)
CREAT SERPL-MCNC: 0.8 MG/DL (ref 0.5–1.4)
EST. GFR  (AFRICAN AMERICAN): >60 ML/MIN/1.73 M^2
EST. GFR  (NON AFRICAN AMERICAN): >60 ML/MIN/1.73 M^2
ESTIMATED AVG GLUCOSE: 117 MG/DL (ref 68–131)
GLUCOSE SERPL-MCNC: 109 MG/DL (ref 70–110)
HBA1C MFR BLD: 5.7 % (ref 4–5.6)
HDLC SERPL-MCNC: 67 MG/DL (ref 40–75)
HDLC SERPL: 36.6 % (ref 20–50)
LDLC SERPL CALC-MCNC: 91.8 MG/DL (ref 63–159)
NONHDLC SERPL-MCNC: 116 MG/DL
POTASSIUM SERPL-SCNC: 4.6 MMOL/L (ref 3.5–5.1)
PROT SERPL-MCNC: 7.2 G/DL (ref 6–8.4)
SODIUM SERPL-SCNC: 141 MMOL/L (ref 136–145)
TRIGL SERPL-MCNC: 121 MG/DL (ref 30–150)

## 2021-03-26 PROCEDURE — 36415 COLL VENOUS BLD VENIPUNCTURE: CPT | Performed by: INTERNAL MEDICINE

## 2021-03-26 PROCEDURE — 83036 HEMOGLOBIN GLYCOSYLATED A1C: CPT | Performed by: INTERNAL MEDICINE

## 2021-03-26 PROCEDURE — 80053 COMPREHEN METABOLIC PANEL: CPT | Performed by: INTERNAL MEDICINE

## 2021-03-26 PROCEDURE — 82550 ASSAY OF CK (CPK): CPT | Performed by: INTERNAL MEDICINE

## 2021-03-26 PROCEDURE — 80061 LIPID PANEL: CPT | Performed by: INTERNAL MEDICINE

## 2021-03-28 ENCOUNTER — PATIENT MESSAGE (OUTPATIENT)
Dept: FAMILY MEDICINE | Facility: CLINIC | Age: 66
End: 2021-03-28

## 2021-03-30 ENCOUNTER — OFFICE VISIT (OUTPATIENT)
Dept: FAMILY MEDICINE | Facility: CLINIC | Age: 66
End: 2021-03-30
Payer: MEDICARE

## 2021-03-30 VITALS
HEIGHT: 66 IN | HEART RATE: 77 BPM | DIASTOLIC BLOOD PRESSURE: 86 MMHG | OXYGEN SATURATION: 99 % | WEIGHT: 170.19 LBS | BODY MASS INDEX: 27.35 KG/M2 | TEMPERATURE: 98 F | SYSTOLIC BLOOD PRESSURE: 108 MMHG

## 2021-03-30 DIAGNOSIS — E78.5 HYPERLIPIDEMIA, UNSPECIFIED HYPERLIPIDEMIA TYPE: Primary | ICD-10-CM

## 2021-03-30 DIAGNOSIS — R73.9 HYPERGLYCEMIA: ICD-10-CM

## 2021-03-30 DIAGNOSIS — K63.5 POLYP OF COLON, UNSPECIFIED PART OF COLON, UNSPECIFIED TYPE: ICD-10-CM

## 2021-03-30 DIAGNOSIS — E03.9 ACQUIRED HYPOTHYROIDISM: ICD-10-CM

## 2021-03-30 PROCEDURE — 1126F PR PAIN SEVERITY QUANTIFIED, NO PAIN PRESENT: ICD-10-PCS | Mod: S$GLB,,, | Performed by: INTERNAL MEDICINE

## 2021-03-30 PROCEDURE — 1101F PR PT FALLS ASSESS DOC 0-1 FALLS W/OUT INJ PAST YR: ICD-10-PCS | Mod: CPTII,S$GLB,, | Performed by: INTERNAL MEDICINE

## 2021-03-30 PROCEDURE — 99999 PR PBB SHADOW E&M-EST. PATIENT-LVL III: ICD-10-PCS | Mod: PBBFAC,,, | Performed by: INTERNAL MEDICINE

## 2021-03-30 PROCEDURE — 3288F PR FALLS RISK ASSESSMENT DOCUMENTED: ICD-10-PCS | Mod: CPTII,S$GLB,, | Performed by: INTERNAL MEDICINE

## 2021-03-30 PROCEDURE — 3008F BODY MASS INDEX DOCD: CPT | Mod: CPTII,S$GLB,, | Performed by: INTERNAL MEDICINE

## 2021-03-30 PROCEDURE — 1126F AMNT PAIN NOTED NONE PRSNT: CPT | Mod: S$GLB,,, | Performed by: INTERNAL MEDICINE

## 2021-03-30 PROCEDURE — 99214 OFFICE O/P EST MOD 30 MIN: CPT | Mod: S$GLB,,, | Performed by: INTERNAL MEDICINE

## 2021-03-30 PROCEDURE — 3008F PR BODY MASS INDEX (BMI) DOCUMENTED: ICD-10-PCS | Mod: CPTII,S$GLB,, | Performed by: INTERNAL MEDICINE

## 2021-03-30 PROCEDURE — 1101F PT FALLS ASSESS-DOCD LE1/YR: CPT | Mod: CPTII,S$GLB,, | Performed by: INTERNAL MEDICINE

## 2021-03-30 PROCEDURE — 99214 PR OFFICE/OUTPT VISIT, EST, LEVL IV, 30-39 MIN: ICD-10-PCS | Mod: S$GLB,,, | Performed by: INTERNAL MEDICINE

## 2021-03-30 PROCEDURE — 3288F FALL RISK ASSESSMENT DOCD: CPT | Mod: CPTII,S$GLB,, | Performed by: INTERNAL MEDICINE

## 2021-03-30 PROCEDURE — 99999 PR PBB SHADOW E&M-EST. PATIENT-LVL III: CPT | Mod: PBBFAC,,, | Performed by: INTERNAL MEDICINE

## 2021-03-30 RX ORDER — ASPIRIN 81 MG/1
81 TABLET ORAL
Qty: 12 TABLET | Refills: 11 | Status: SHIPPED | OUTPATIENT
Start: 2021-03-31 | End: 2023-10-12

## 2021-03-31 ENCOUNTER — IMMUNIZATION (OUTPATIENT)
Dept: PHARMACY | Facility: CLINIC | Age: 66
End: 2021-03-31
Payer: MEDICARE

## 2021-03-31 DIAGNOSIS — Z23 NEED FOR VACCINATION: Primary | ICD-10-CM

## 2021-04-05 ENCOUNTER — TELEPHONE (OUTPATIENT)
Dept: GASTROENTEROLOGY | Facility: CLINIC | Age: 66
End: 2021-04-05

## 2021-04-09 ENCOUNTER — PATIENT MESSAGE (OUTPATIENT)
Dept: FAMILY MEDICINE | Facility: CLINIC | Age: 66
End: 2021-04-09

## 2021-04-11 ENCOUNTER — TELEPHONE (OUTPATIENT)
Dept: GASTROENTEROLOGY | Facility: HOSPITAL | Age: 66
End: 2021-04-11

## 2021-04-11 ENCOUNTER — PATIENT MESSAGE (OUTPATIENT)
Dept: FAMILY MEDICINE | Facility: CLINIC | Age: 66
End: 2021-04-11

## 2021-04-28 ENCOUNTER — IMMUNIZATION (OUTPATIENT)
Dept: PHARMACY | Facility: CLINIC | Age: 66
End: 2021-04-28
Payer: MEDICARE

## 2021-04-28 DIAGNOSIS — Z23 NEED FOR VACCINATION: Primary | ICD-10-CM

## 2021-06-11 ENCOUNTER — LAB VISIT (OUTPATIENT)
Dept: LAB | Facility: HOSPITAL | Age: 66
End: 2021-06-11
Attending: INTERNAL MEDICINE
Payer: MEDICARE

## 2021-06-11 DIAGNOSIS — E03.9 ACQUIRED HYPOTHYROIDISM: ICD-10-CM

## 2021-06-11 LAB
T4 FREE SERPL-MCNC: 0.85 NG/DL (ref 0.71–1.51)
TSH SERPL DL<=0.005 MIU/L-ACNC: 5.61 UIU/ML (ref 0.4–4)

## 2021-06-11 PROCEDURE — 36415 COLL VENOUS BLD VENIPUNCTURE: CPT | Performed by: INTERNAL MEDICINE

## 2021-06-11 PROCEDURE — 84439 ASSAY OF FREE THYROXINE: CPT | Performed by: INTERNAL MEDICINE

## 2021-06-11 PROCEDURE — 84443 ASSAY THYROID STIM HORMONE: CPT | Performed by: INTERNAL MEDICINE

## 2021-06-15 ENCOUNTER — OFFICE VISIT (OUTPATIENT)
Dept: FAMILY MEDICINE | Facility: CLINIC | Age: 66
End: 2021-06-15
Payer: MEDICARE

## 2021-06-15 VITALS
WEIGHT: 165.81 LBS | TEMPERATURE: 98 F | SYSTOLIC BLOOD PRESSURE: 112 MMHG | BODY MASS INDEX: 26.65 KG/M2 | DIASTOLIC BLOOD PRESSURE: 68 MMHG | HEART RATE: 78 BPM | OXYGEN SATURATION: 98 % | HEIGHT: 66 IN

## 2021-06-15 DIAGNOSIS — K63.5 POLYP OF COLON, UNSPECIFIED PART OF COLON, UNSPECIFIED TYPE: Primary | ICD-10-CM

## 2021-06-15 DIAGNOSIS — K63.5 SERRATED POLYP OF COLON: ICD-10-CM

## 2021-06-15 DIAGNOSIS — E78.00 PURE HYPERCHOLESTEROLEMIA: ICD-10-CM

## 2021-06-15 DIAGNOSIS — H04.129 DRY EYE: ICD-10-CM

## 2021-06-15 DIAGNOSIS — E03.8 SUBCLINICAL HYPOTHYROIDISM: ICD-10-CM

## 2021-06-15 DIAGNOSIS — R73.03 PREDIABETES: ICD-10-CM

## 2021-06-15 DIAGNOSIS — E78.5 HYPERLIPIDEMIA, UNSPECIFIED HYPERLIPIDEMIA TYPE: ICD-10-CM

## 2021-06-15 DIAGNOSIS — E03.9 ACQUIRED HYPOTHYROIDISM: ICD-10-CM

## 2021-06-15 DIAGNOSIS — R73.9 HYPERGLYCEMIA: ICD-10-CM

## 2021-06-15 PROCEDURE — 99499 RISK ADDL DX/OHS AUDIT: ICD-10-PCS | Mod: S$GLB,,, | Performed by: INTERNAL MEDICINE

## 2021-06-15 PROCEDURE — 1101F PT FALLS ASSESS-DOCD LE1/YR: CPT | Mod: CPTII,S$GLB,, | Performed by: INTERNAL MEDICINE

## 2021-06-15 PROCEDURE — 3288F FALL RISK ASSESSMENT DOCD: CPT | Mod: CPTII,S$GLB,, | Performed by: INTERNAL MEDICINE

## 2021-06-15 PROCEDURE — 1101F PR PT FALLS ASSESS DOC 0-1 FALLS W/OUT INJ PAST YR: ICD-10-PCS | Mod: CPTII,S$GLB,, | Performed by: INTERNAL MEDICINE

## 2021-06-15 PROCEDURE — 3008F PR BODY MASS INDEX (BMI) DOCUMENTED: ICD-10-PCS | Mod: CPTII,S$GLB,, | Performed by: INTERNAL MEDICINE

## 2021-06-15 PROCEDURE — 99999 PR PBB SHADOW E&M-EST. PATIENT-LVL III: CPT | Mod: PBBFAC,,, | Performed by: INTERNAL MEDICINE

## 2021-06-15 PROCEDURE — 1126F PR PAIN SEVERITY QUANTIFIED, NO PAIN PRESENT: ICD-10-PCS | Mod: S$GLB,,, | Performed by: INTERNAL MEDICINE

## 2021-06-15 PROCEDURE — 3288F PR FALLS RISK ASSESSMENT DOCUMENTED: ICD-10-PCS | Mod: CPTII,S$GLB,, | Performed by: INTERNAL MEDICINE

## 2021-06-15 PROCEDURE — 99214 OFFICE O/P EST MOD 30 MIN: CPT | Mod: S$GLB,,, | Performed by: INTERNAL MEDICINE

## 2021-06-15 PROCEDURE — 99999 PR PBB SHADOW E&M-EST. PATIENT-LVL III: ICD-10-PCS | Mod: PBBFAC,,, | Performed by: INTERNAL MEDICINE

## 2021-06-15 PROCEDURE — 3008F BODY MASS INDEX DOCD: CPT | Mod: CPTII,S$GLB,, | Performed by: INTERNAL MEDICINE

## 2021-06-15 PROCEDURE — 99214 PR OFFICE/OUTPT VISIT, EST, LEVL IV, 30-39 MIN: ICD-10-PCS | Mod: S$GLB,,, | Performed by: INTERNAL MEDICINE

## 2021-06-15 PROCEDURE — 99499 UNLISTED E&M SERVICE: CPT | Mod: S$GLB,,, | Performed by: INTERNAL MEDICINE

## 2021-06-15 PROCEDURE — 1126F AMNT PAIN NOTED NONE PRSNT: CPT | Mod: S$GLB,,, | Performed by: INTERNAL MEDICINE

## 2021-06-15 RX ORDER — KETOCONAZOLE 20 MG/G
CREAM TOPICAL DAILY
Qty: 30 G | Refills: 6 | Status: SHIPPED | OUTPATIENT
Start: 2021-06-15 | End: 2024-03-12 | Stop reason: SDUPTHER

## 2021-06-15 RX ORDER — ATORVASTATIN CALCIUM 10 MG/1
10 TABLET, FILM COATED ORAL DAILY
Qty: 90 TABLET | Refills: 3 | Status: SHIPPED | OUTPATIENT
Start: 2021-06-15 | End: 2021-07-27 | Stop reason: SDUPTHER

## 2021-06-15 RX ORDER — POLYETHYLENE GLYCOL 400 AND PROPYLENE GLYCOL 4; 3 MG/ML; MG/ML
2 SOLUTION/ DROPS OPHTHALMIC DAILY
Qty: 10 EACH | Refills: 6 | Status: SHIPPED | OUTPATIENT
Start: 2021-06-15 | End: 2023-10-12

## 2021-06-16 PROBLEM — E03.8 SUBCLINICAL HYPOTHYROIDISM: Status: ACTIVE | Noted: 2021-06-16

## 2021-06-16 PROBLEM — R73.03 PREDIABETES: Status: ACTIVE | Noted: 2021-06-16

## 2021-06-16 PROBLEM — K63.5 SERRATED POLYP OF COLON: Status: ACTIVE | Noted: 2021-06-16

## 2021-06-16 PROBLEM — E78.00 PURE HYPERCHOLESTEROLEMIA: Status: ACTIVE | Noted: 2021-06-16

## 2021-07-27 DIAGNOSIS — E78.00 PURE HYPERCHOLESTEROLEMIA: Primary | ICD-10-CM

## 2021-07-27 RX ORDER — ATORVASTATIN CALCIUM 10 MG/1
10 TABLET, FILM COATED ORAL DAILY
Qty: 90 TABLET | Refills: 3 | Status: SHIPPED | OUTPATIENT
Start: 2021-07-27 | End: 2021-07-29 | Stop reason: SDUPTHER

## 2021-07-28 ENCOUNTER — TELEPHONE (OUTPATIENT)
Dept: FAMILY MEDICINE | Facility: CLINIC | Age: 66
End: 2021-07-28

## 2021-07-29 ENCOUNTER — PATIENT MESSAGE (OUTPATIENT)
Dept: FAMILY MEDICINE | Facility: CLINIC | Age: 66
End: 2021-07-29

## 2021-07-29 DIAGNOSIS — E78.00 PURE HYPERCHOLESTEROLEMIA: ICD-10-CM

## 2021-07-29 RX ORDER — ATORVASTATIN CALCIUM 10 MG/1
10 TABLET, FILM COATED ORAL DAILY
Qty: 90 TABLET | Refills: 3 | Status: SHIPPED | OUTPATIENT
Start: 2021-07-29 | End: 2021-09-23 | Stop reason: SDUPTHER

## 2021-07-31 ENCOUNTER — PATIENT MESSAGE (OUTPATIENT)
Dept: FAMILY MEDICINE | Facility: CLINIC | Age: 66
End: 2021-07-31

## 2021-08-09 ENCOUNTER — PATIENT MESSAGE (OUTPATIENT)
Dept: FAMILY MEDICINE | Facility: CLINIC | Age: 66
End: 2021-08-09

## 2021-08-20 ENCOUNTER — TELEPHONE (OUTPATIENT)
Dept: FAMILY MEDICINE | Facility: CLINIC | Age: 66
End: 2021-08-20

## 2021-08-20 NOTE — TELEPHONE ENCOUNTER
Covering for Dr. Willis Suh, could you please check back with this patient.  GI is recommending he does need further treatment of his colon polyps.  Per notes, he has been hesitant to have any treatment done for this but per GI message, this is very concerning that he needs management of this problem.  His gastroenterologist is recommending that he follow up in clinic.  Please verify that this is okay and see if he can get a repeat appointment with his gastroenterologist.  Please see message from his gastroenterologist below.

## 2021-08-20 NOTE — TELEPHONE ENCOUNTER
----- Message from Evan Byers MD sent at 8/17/2021 12:43 PM CDT -----  Regarding: shared patient  Hello again,   I set myself reminder to check on this status of the patient.    It seems nothing has been done.  I have already spoken with one of our advanced endoscopists , who felt appendectomy was better way to go...    I dont want this to be forgotten...  He needs to follow up in clinic with us..  Do you think he would be more willing to see one of our advanced endoscopists? Especially if they are [place] speaking?    Leaving this alone is NOT a good option.    I am happy to see him in clinic but wanted your thoughts    thanks

## 2021-09-09 ENCOUNTER — PATIENT MESSAGE (OUTPATIENT)
Dept: FAMILY MEDICINE | Facility: CLINIC | Age: 66
End: 2021-09-09

## 2021-09-20 ENCOUNTER — LAB VISIT (OUTPATIENT)
Dept: LAB | Facility: HOSPITAL | Age: 66
End: 2021-09-20
Attending: INTERNAL MEDICINE
Payer: COMMERCIAL

## 2021-09-20 DIAGNOSIS — R73.9 HYPERGLYCEMIA: ICD-10-CM

## 2021-09-20 DIAGNOSIS — E03.9 ACQUIRED HYPOTHYROIDISM: ICD-10-CM

## 2021-09-20 DIAGNOSIS — E78.5 HYPERLIPIDEMIA, UNSPECIFIED HYPERLIPIDEMIA TYPE: ICD-10-CM

## 2021-09-20 DIAGNOSIS — K63.5 POLYP OF COLON, UNSPECIFIED PART OF COLON, UNSPECIFIED TYPE: ICD-10-CM

## 2021-09-20 LAB
ALBUMIN SERPL BCP-MCNC: 4.2 G/DL (ref 3.5–5.2)
ALP SERPL-CCNC: 60 U/L (ref 55–135)
ALT SERPL W/O P-5'-P-CCNC: 21 U/L (ref 10–44)
ANION GAP SERPL CALC-SCNC: 11 MMOL/L (ref 8–16)
AST SERPL-CCNC: 23 U/L (ref 10–40)
BASOPHILS # BLD AUTO: 0.06 K/UL (ref 0–0.2)
BASOPHILS NFR BLD: 1 % (ref 0–1.9)
BILIRUB SERPL-MCNC: 0.8 MG/DL (ref 0.1–1)
BUN SERPL-MCNC: 9 MG/DL (ref 8–23)
CALCIUM SERPL-MCNC: 10.1 MG/DL (ref 8.7–10.5)
CHLORIDE SERPL-SCNC: 104 MMOL/L (ref 95–110)
CHOLEST SERPL-MCNC: 297 MG/DL (ref 120–199)
CHOLEST/HDLC SERPL: 4.8 {RATIO} (ref 2–5)
CO2 SERPL-SCNC: 26 MMOL/L (ref 23–29)
CREAT SERPL-MCNC: 0.8 MG/DL (ref 0.5–1.4)
CRP SERPL-MCNC: 1.7 MG/L (ref 0–8.2)
DIFFERENTIAL METHOD: NORMAL
EOSINOPHIL # BLD AUTO: 0.1 K/UL (ref 0–0.5)
EOSINOPHIL NFR BLD: 2 % (ref 0–8)
ERYTHROCYTE [DISTWIDTH] IN BLOOD BY AUTOMATED COUNT: 13.3 % (ref 11.5–14.5)
EST. GFR  (AFRICAN AMERICAN): >60 ML/MIN/1.73 M^2
EST. GFR  (NON AFRICAN AMERICAN): >60 ML/MIN/1.73 M^2
ESTIMATED AVG GLUCOSE: 117 MG/DL (ref 68–131)
GLUCOSE SERPL-MCNC: 111 MG/DL (ref 70–110)
HBA1C MFR BLD: 5.7 % (ref 4–5.6)
HCT VFR BLD AUTO: 44.6 % (ref 40–54)
HDLC SERPL-MCNC: 62 MG/DL (ref 40–75)
HDLC SERPL: 20.9 % (ref 20–50)
HGB BLD-MCNC: 14.7 G/DL (ref 14–18)
IMM GRANULOCYTES # BLD AUTO: 0.02 K/UL (ref 0–0.04)
IMM GRANULOCYTES NFR BLD AUTO: 0.3 % (ref 0–0.5)
LDLC SERPL CALC-MCNC: 189 MG/DL (ref 63–159)
LYMPHOCYTES # BLD AUTO: 2.6 K/UL (ref 1–4.8)
LYMPHOCYTES NFR BLD: 43.6 % (ref 18–48)
MCH RBC QN AUTO: 30.2 PG (ref 27–31)
MCHC RBC AUTO-ENTMCNC: 33 G/DL (ref 32–36)
MCV RBC AUTO: 92 FL (ref 82–98)
MONOCYTES # BLD AUTO: 0.7 K/UL (ref 0.3–1)
MONOCYTES NFR BLD: 11.6 % (ref 4–15)
NEUTROPHILS # BLD AUTO: 2.5 K/UL (ref 1.8–7.7)
NEUTROPHILS NFR BLD: 41.5 % (ref 38–73)
NONHDLC SERPL-MCNC: 235 MG/DL
NRBC BLD-RTO: 0 /100 WBC
PLATELET # BLD AUTO: 259 K/UL (ref 150–450)
PMV BLD AUTO: 9.6 FL (ref 9.2–12.9)
POTASSIUM SERPL-SCNC: 4.5 MMOL/L (ref 3.5–5.1)
PROT SERPL-MCNC: 7.5 G/DL (ref 6–8.4)
RBC # BLD AUTO: 4.86 M/UL (ref 4.6–6.2)
SODIUM SERPL-SCNC: 141 MMOL/L (ref 136–145)
T4 FREE SERPL-MCNC: 0.96 NG/DL (ref 0.71–1.51)
TRIGL SERPL-MCNC: 230 MG/DL (ref 30–150)
TSH SERPL DL<=0.005 MIU/L-ACNC: 11.44 UIU/ML (ref 0.4–4)
WBC # BLD AUTO: 6.05 K/UL (ref 3.9–12.7)

## 2021-09-20 PROCEDURE — 84439 ASSAY OF FREE THYROXINE: CPT | Performed by: INTERNAL MEDICINE

## 2021-09-20 PROCEDURE — 36415 COLL VENOUS BLD VENIPUNCTURE: CPT | Performed by: INTERNAL MEDICINE

## 2021-09-20 PROCEDURE — 84443 ASSAY THYROID STIM HORMONE: CPT | Performed by: INTERNAL MEDICINE

## 2021-09-20 PROCEDURE — 80053 COMPREHEN METABOLIC PANEL: CPT | Performed by: INTERNAL MEDICINE

## 2021-09-20 PROCEDURE — 85025 COMPLETE CBC W/AUTO DIFF WBC: CPT | Performed by: INTERNAL MEDICINE

## 2021-09-20 PROCEDURE — 86140 C-REACTIVE PROTEIN: CPT | Performed by: INTERNAL MEDICINE

## 2021-09-20 PROCEDURE — 80061 LIPID PANEL: CPT | Performed by: INTERNAL MEDICINE

## 2021-09-20 PROCEDURE — 83036 HEMOGLOBIN GLYCOSYLATED A1C: CPT | Performed by: INTERNAL MEDICINE

## 2021-09-23 ENCOUNTER — OFFICE VISIT (OUTPATIENT)
Dept: FAMILY MEDICINE | Facility: CLINIC | Age: 66
End: 2021-09-23
Payer: MEDICARE

## 2021-09-23 VITALS
SYSTOLIC BLOOD PRESSURE: 124 MMHG | WEIGHT: 162.94 LBS | OXYGEN SATURATION: 98 % | TEMPERATURE: 98 F | BODY MASS INDEX: 26.19 KG/M2 | HEIGHT: 66 IN | HEART RATE: 73 BPM | DIASTOLIC BLOOD PRESSURE: 74 MMHG

## 2021-09-23 DIAGNOSIS — E03.9 HYPOTHYROIDISM, UNSPECIFIED TYPE: ICD-10-CM

## 2021-09-23 DIAGNOSIS — M54.50 ACUTE BILATERAL LOW BACK PAIN, UNSPECIFIED WHETHER SCIATICA PRESENT: ICD-10-CM

## 2021-09-23 DIAGNOSIS — E03.4 HYPOTHYROIDISM DUE TO ACQUIRED ATROPHY OF THYROID: ICD-10-CM

## 2021-09-23 DIAGNOSIS — K63.5 POLYP OF COLON, UNSPECIFIED PART OF COLON, UNSPECIFIED TYPE: Primary | ICD-10-CM

## 2021-09-23 DIAGNOSIS — E78.00 PURE HYPERCHOLESTEROLEMIA: ICD-10-CM

## 2021-09-23 DIAGNOSIS — R73.03 PREDIABETES: ICD-10-CM

## 2021-09-23 DIAGNOSIS — L98.9 SKIN LESION OF FACE: ICD-10-CM

## 2021-09-23 PROCEDURE — 3074F SYST BP LT 130 MM HG: CPT | Mod: CPTII,S$GLB,, | Performed by: INTERNAL MEDICINE

## 2021-09-23 PROCEDURE — 1126F AMNT PAIN NOTED NONE PRSNT: CPT | Mod: CPTII,S$GLB,, | Performed by: INTERNAL MEDICINE

## 2021-09-23 PROCEDURE — 1101F PT FALLS ASSESS-DOCD LE1/YR: CPT | Mod: CPTII,S$GLB,, | Performed by: INTERNAL MEDICINE

## 2021-09-23 PROCEDURE — 3008F BODY MASS INDEX DOCD: CPT | Mod: CPTII,S$GLB,, | Performed by: INTERNAL MEDICINE

## 2021-09-23 PROCEDURE — 99999 PR PBB SHADOW E&M-EST. PATIENT-LVL V: CPT | Mod: PBBFAC,,, | Performed by: INTERNAL MEDICINE

## 2021-09-23 PROCEDURE — 99214 OFFICE O/P EST MOD 30 MIN: CPT | Mod: S$GLB,,, | Performed by: INTERNAL MEDICINE

## 2021-09-23 PROCEDURE — 3044F HG A1C LEVEL LT 7.0%: CPT | Mod: CPTII,S$GLB,, | Performed by: INTERNAL MEDICINE

## 2021-09-23 PROCEDURE — 3288F PR FALLS RISK ASSESSMENT DOCUMENTED: ICD-10-PCS | Mod: CPTII,S$GLB,, | Performed by: INTERNAL MEDICINE

## 2021-09-23 PROCEDURE — 3074F PR MOST RECENT SYSTOLIC BLOOD PRESSURE < 130 MM HG: ICD-10-PCS | Mod: CPTII,S$GLB,, | Performed by: INTERNAL MEDICINE

## 2021-09-23 PROCEDURE — 3288F FALL RISK ASSESSMENT DOCD: CPT | Mod: CPTII,S$GLB,, | Performed by: INTERNAL MEDICINE

## 2021-09-23 PROCEDURE — 99999 PR PBB SHADOW E&M-EST. PATIENT-LVL V: ICD-10-PCS | Mod: PBBFAC,,, | Performed by: INTERNAL MEDICINE

## 2021-09-23 PROCEDURE — 1101F PR PT FALLS ASSESS DOC 0-1 FALLS W/OUT INJ PAST YR: ICD-10-PCS | Mod: CPTII,S$GLB,, | Performed by: INTERNAL MEDICINE

## 2021-09-23 PROCEDURE — 1160F RVW MEDS BY RX/DR IN RCRD: CPT | Mod: CPTII,S$GLB,, | Performed by: INTERNAL MEDICINE

## 2021-09-23 PROCEDURE — 3078F DIAST BP <80 MM HG: CPT | Mod: CPTII,S$GLB,, | Performed by: INTERNAL MEDICINE

## 2021-09-23 PROCEDURE — 99214 PR OFFICE/OUTPT VISIT, EST, LEVL IV, 30-39 MIN: ICD-10-PCS | Mod: S$GLB,,, | Performed by: INTERNAL MEDICINE

## 2021-09-23 PROCEDURE — 1159F MED LIST DOCD IN RCRD: CPT | Mod: CPTII,S$GLB,, | Performed by: INTERNAL MEDICINE

## 2021-09-23 PROCEDURE — 3078F PR MOST RECENT DIASTOLIC BLOOD PRESSURE < 80 MM HG: ICD-10-PCS | Mod: CPTII,S$GLB,, | Performed by: INTERNAL MEDICINE

## 2021-09-23 PROCEDURE — 1126F PR PAIN SEVERITY QUANTIFIED, NO PAIN PRESENT: ICD-10-PCS | Mod: CPTII,S$GLB,, | Performed by: INTERNAL MEDICINE

## 2021-09-23 PROCEDURE — 99499 RISK ADDL DX/OHS AUDIT: ICD-10-PCS | Mod: HCNC,S$GLB,, | Performed by: INTERNAL MEDICINE

## 2021-09-23 PROCEDURE — 3008F PR BODY MASS INDEX (BMI) DOCUMENTED: ICD-10-PCS | Mod: CPTII,S$GLB,, | Performed by: INTERNAL MEDICINE

## 2021-09-23 PROCEDURE — 99499 UNLISTED E&M SERVICE: CPT | Mod: HCNC,S$GLB,, | Performed by: INTERNAL MEDICINE

## 2021-09-23 PROCEDURE — 3044F PR MOST RECENT HEMOGLOBIN A1C LEVEL <7.0%: ICD-10-PCS | Mod: CPTII,S$GLB,, | Performed by: INTERNAL MEDICINE

## 2021-09-23 PROCEDURE — 1160F PR REVIEW ALL MEDS BY PRESCRIBER/CLIN PHARMACIST DOCUMENTED: ICD-10-PCS | Mod: CPTII,S$GLB,, | Performed by: INTERNAL MEDICINE

## 2021-09-23 PROCEDURE — 1159F PR MEDICATION LIST DOCUMENTED IN MEDICAL RECORD: ICD-10-PCS | Mod: CPTII,S$GLB,, | Performed by: INTERNAL MEDICINE

## 2021-09-23 RX ORDER — LEVOTHYROXINE SODIUM 50 UG/1
50 TABLET ORAL
Qty: 30 TABLET | Refills: 11 | Status: SHIPPED | OUTPATIENT
Start: 2021-09-23 | End: 2022-05-21

## 2021-09-23 RX ORDER — ATORVASTATIN CALCIUM 10 MG/1
10 TABLET, FILM COATED ORAL DAILY
Qty: 90 TABLET | Refills: 3 | Status: SHIPPED | OUTPATIENT
Start: 2021-09-23 | End: 2022-08-05

## 2021-09-24 ENCOUNTER — TELEPHONE (OUTPATIENT)
Dept: ADMINISTRATIVE | Facility: OTHER | Age: 66
End: 2021-09-24

## 2021-09-24 ENCOUNTER — TELEPHONE (OUTPATIENT)
Dept: ENDOSCOPY | Facility: HOSPITAL | Age: 66
End: 2021-09-24

## 2021-09-27 ENCOUNTER — TELEPHONE (OUTPATIENT)
Dept: FAMILY MEDICINE | Facility: CLINIC | Age: 66
End: 2021-09-27

## 2021-11-04 ENCOUNTER — PATIENT MESSAGE (OUTPATIENT)
Dept: FAMILY MEDICINE | Facility: CLINIC | Age: 66
End: 2021-11-04
Payer: COMMERCIAL

## 2021-11-15 ENCOUNTER — LAB VISIT (OUTPATIENT)
Dept: LAB | Facility: HOSPITAL | Age: 66
End: 2021-11-15
Attending: INTERNAL MEDICINE
Payer: MEDICARE

## 2021-11-15 DIAGNOSIS — R73.03 PREDIABETES: ICD-10-CM

## 2021-11-15 DIAGNOSIS — K63.5 POLYP OF COLON, UNSPECIFIED PART OF COLON, UNSPECIFIED TYPE: ICD-10-CM

## 2021-11-15 DIAGNOSIS — E03.4 HYPOTHYROIDISM DUE TO ACQUIRED ATROPHY OF THYROID: ICD-10-CM

## 2021-11-15 DIAGNOSIS — E78.00 PURE HYPERCHOLESTEROLEMIA: ICD-10-CM

## 2021-11-15 LAB
ALBUMIN SERPL BCP-MCNC: 4.3 G/DL (ref 3.5–5.2)
ALP SERPL-CCNC: 74 U/L (ref 55–135)
ALT SERPL W/O P-5'-P-CCNC: 24 U/L (ref 10–44)
ANION GAP SERPL CALC-SCNC: 11 MMOL/L (ref 8–16)
AST SERPL-CCNC: 26 U/L (ref 10–40)
BILIRUB SERPL-MCNC: 0.6 MG/DL (ref 0.1–1)
BUN SERPL-MCNC: 8 MG/DL (ref 8–23)
CALCIUM SERPL-MCNC: 9.3 MG/DL (ref 8.7–10.5)
CHLORIDE SERPL-SCNC: 104 MMOL/L (ref 95–110)
CHOLEST SERPL-MCNC: 239 MG/DL (ref 120–199)
CHOLEST/HDLC SERPL: 3.5 {RATIO} (ref 2–5)
CO2 SERPL-SCNC: 26 MMOL/L (ref 23–29)
CREAT SERPL-MCNC: 0.8 MG/DL (ref 0.5–1.4)
EST. GFR  (AFRICAN AMERICAN): >60 ML/MIN/1.73 M^2
EST. GFR  (NON AFRICAN AMERICAN): >60 ML/MIN/1.73 M^2
GLUCOSE SERPL-MCNC: 122 MG/DL (ref 70–110)
HDLC SERPL-MCNC: 69 MG/DL (ref 40–75)
HDLC SERPL: 28.9 % (ref 20–50)
LDLC SERPL CALC-MCNC: 139.2 MG/DL (ref 63–159)
NONHDLC SERPL-MCNC: 170 MG/DL
POTASSIUM SERPL-SCNC: 4 MMOL/L (ref 3.5–5.1)
PROT SERPL-MCNC: 7.7 G/DL (ref 6–8.4)
SODIUM SERPL-SCNC: 141 MMOL/L (ref 136–145)
T4 FREE SERPL-MCNC: 0.97 NG/DL (ref 0.71–1.51)
TRIGL SERPL-MCNC: 154 MG/DL (ref 30–150)
TSH SERPL DL<=0.005 MIU/L-ACNC: 5.77 UIU/ML (ref 0.4–4)

## 2021-11-15 PROCEDURE — 84439 ASSAY OF FREE THYROXINE: CPT | Mod: HCNC | Performed by: INTERNAL MEDICINE

## 2021-11-15 PROCEDURE — 80053 COMPREHEN METABOLIC PANEL: CPT | Mod: HCNC | Performed by: INTERNAL MEDICINE

## 2021-11-15 PROCEDURE — 80061 LIPID PANEL: CPT | Mod: HCNC | Performed by: INTERNAL MEDICINE

## 2021-11-15 PROCEDURE — 36415 COLL VENOUS BLD VENIPUNCTURE: CPT | Mod: HCNC | Performed by: INTERNAL MEDICINE

## 2021-11-15 PROCEDURE — 84443 ASSAY THYROID STIM HORMONE: CPT | Mod: HCNC | Performed by: INTERNAL MEDICINE

## 2021-11-18 ENCOUNTER — OFFICE VISIT (OUTPATIENT)
Dept: FAMILY MEDICINE | Facility: CLINIC | Age: 66
End: 2021-11-18
Payer: MEDICARE

## 2021-11-18 VITALS
DIASTOLIC BLOOD PRESSURE: 88 MMHG | HEART RATE: 74 BPM | BODY MASS INDEX: 26.86 KG/M2 | WEIGHT: 167.13 LBS | SYSTOLIC BLOOD PRESSURE: 110 MMHG | HEIGHT: 66 IN | OXYGEN SATURATION: 98 %

## 2021-11-18 DIAGNOSIS — Z79.899 MEDICATION MANAGEMENT: ICD-10-CM

## 2021-11-18 DIAGNOSIS — R73.03 PREDIABETES: ICD-10-CM

## 2021-11-18 DIAGNOSIS — K63.5 POLYP OF COLON, UNSPECIFIED PART OF COLON, UNSPECIFIED TYPE: ICD-10-CM

## 2021-11-18 DIAGNOSIS — Z12.5 PROSTATE CANCER SCREENING: ICD-10-CM

## 2021-11-18 DIAGNOSIS — E03.9 HYPOTHYROIDISM, UNSPECIFIED TYPE: Primary | ICD-10-CM

## 2021-11-18 DIAGNOSIS — E78.00 PURE HYPERCHOLESTEROLEMIA: ICD-10-CM

## 2021-11-18 PROCEDURE — 1101F PR PT FALLS ASSESS DOC 0-1 FALLS W/OUT INJ PAST YR: ICD-10-PCS | Mod: HCNC,CPTII,S$GLB, | Performed by: INTERNAL MEDICINE

## 2021-11-18 PROCEDURE — 1126F PR PAIN SEVERITY QUANTIFIED, NO PAIN PRESENT: ICD-10-PCS | Mod: HCNC,CPTII,S$GLB, | Performed by: INTERNAL MEDICINE

## 2021-11-18 PROCEDURE — 3079F DIAST BP 80-89 MM HG: CPT | Mod: HCNC,CPTII,S$GLB, | Performed by: INTERNAL MEDICINE

## 2021-11-18 PROCEDURE — 99214 OFFICE O/P EST MOD 30 MIN: CPT | Mod: HCNC,S$GLB,, | Performed by: INTERNAL MEDICINE

## 2021-11-18 PROCEDURE — 1126F AMNT PAIN NOTED NONE PRSNT: CPT | Mod: HCNC,CPTII,S$GLB, | Performed by: INTERNAL MEDICINE

## 2021-11-18 PROCEDURE — 99499 UNLISTED E&M SERVICE: CPT | Mod: S$GLB,,, | Performed by: INTERNAL MEDICINE

## 2021-11-18 PROCEDURE — 1159F PR MEDICATION LIST DOCUMENTED IN MEDICAL RECORD: ICD-10-PCS | Mod: HCNC,CPTII,S$GLB, | Performed by: INTERNAL MEDICINE

## 2021-11-18 PROCEDURE — 99999 PR PBB SHADOW E&M-EST. PATIENT-LVL III: CPT | Mod: PBBFAC,HCNC,, | Performed by: INTERNAL MEDICINE

## 2021-11-18 PROCEDURE — 3079F PR MOST RECENT DIASTOLIC BLOOD PRESSURE 80-89 MM HG: ICD-10-PCS | Mod: HCNC,CPTII,S$GLB, | Performed by: INTERNAL MEDICINE

## 2021-11-18 PROCEDURE — 3288F FALL RISK ASSESSMENT DOCD: CPT | Mod: HCNC,CPTII,S$GLB, | Performed by: INTERNAL MEDICINE

## 2021-11-18 PROCEDURE — 3008F PR BODY MASS INDEX (BMI) DOCUMENTED: ICD-10-PCS | Mod: HCNC,CPTII,S$GLB, | Performed by: INTERNAL MEDICINE

## 2021-11-18 PROCEDURE — 3074F SYST BP LT 130 MM HG: CPT | Mod: HCNC,CPTII,S$GLB, | Performed by: INTERNAL MEDICINE

## 2021-11-18 PROCEDURE — 99499 RISK ADDL DX/OHS AUDIT: ICD-10-PCS | Mod: S$GLB,,, | Performed by: INTERNAL MEDICINE

## 2021-11-18 PROCEDURE — 99214 PR OFFICE/OUTPT VISIT, EST, LEVL IV, 30-39 MIN: ICD-10-PCS | Mod: HCNC,S$GLB,, | Performed by: INTERNAL MEDICINE

## 2021-11-18 PROCEDURE — 1101F PT FALLS ASSESS-DOCD LE1/YR: CPT | Mod: HCNC,CPTII,S$GLB, | Performed by: INTERNAL MEDICINE

## 2021-11-18 PROCEDURE — 1160F RVW MEDS BY RX/DR IN RCRD: CPT | Mod: HCNC,CPTII,S$GLB, | Performed by: INTERNAL MEDICINE

## 2021-11-18 PROCEDURE — 1160F PR REVIEW ALL MEDS BY PRESCRIBER/CLIN PHARMACIST DOCUMENTED: ICD-10-PCS | Mod: HCNC,CPTII,S$GLB, | Performed by: INTERNAL MEDICINE

## 2021-11-18 PROCEDURE — 1159F MED LIST DOCD IN RCRD: CPT | Mod: HCNC,CPTII,S$GLB, | Performed by: INTERNAL MEDICINE

## 2021-11-18 PROCEDURE — 3008F BODY MASS INDEX DOCD: CPT | Mod: HCNC,CPTII,S$GLB, | Performed by: INTERNAL MEDICINE

## 2021-11-18 PROCEDURE — 99999 PR PBB SHADOW E&M-EST. PATIENT-LVL III: ICD-10-PCS | Mod: PBBFAC,HCNC,, | Performed by: INTERNAL MEDICINE

## 2021-11-18 PROCEDURE — 3288F PR FALLS RISK ASSESSMENT DOCUMENTED: ICD-10-PCS | Mod: HCNC,CPTII,S$GLB, | Performed by: INTERNAL MEDICINE

## 2021-11-18 PROCEDURE — 3044F PR MOST RECENT HEMOGLOBIN A1C LEVEL <7.0%: ICD-10-PCS | Mod: HCNC,CPTII,S$GLB, | Performed by: INTERNAL MEDICINE

## 2021-11-18 PROCEDURE — 3074F PR MOST RECENT SYSTOLIC BLOOD PRESSURE < 130 MM HG: ICD-10-PCS | Mod: HCNC,CPTII,S$GLB, | Performed by: INTERNAL MEDICINE

## 2021-11-18 PROCEDURE — 3044F HG A1C LEVEL LT 7.0%: CPT | Mod: HCNC,CPTII,S$GLB, | Performed by: INTERNAL MEDICINE

## 2022-03-14 ENCOUNTER — LAB VISIT (OUTPATIENT)
Dept: LAB | Facility: HOSPITAL | Age: 67
End: 2022-03-14
Attending: INTERNAL MEDICINE
Payer: COMMERCIAL

## 2022-03-14 ENCOUNTER — PATIENT MESSAGE (OUTPATIENT)
Dept: FAMILY MEDICINE | Facility: CLINIC | Age: 67
End: 2022-03-14
Payer: COMMERCIAL

## 2022-03-14 DIAGNOSIS — E78.00 PURE HYPERCHOLESTEROLEMIA: ICD-10-CM

## 2022-03-14 DIAGNOSIS — Z12.5 PROSTATE CANCER SCREENING: ICD-10-CM

## 2022-03-14 DIAGNOSIS — Z79.899 MEDICATION MANAGEMENT: ICD-10-CM

## 2022-03-14 DIAGNOSIS — R73.03 PREDIABETES: ICD-10-CM

## 2022-03-14 DIAGNOSIS — E03.9 HYPOTHYROIDISM, UNSPECIFIED TYPE: ICD-10-CM

## 2022-03-14 LAB
25(OH)D3+25(OH)D2 SERPL-MCNC: 25 NG/ML (ref 30–96)
ALBUMIN SERPL BCP-MCNC: 4.2 G/DL (ref 3.5–5.2)
ALP SERPL-CCNC: 65 U/L (ref 55–135)
ALT SERPL W/O P-5'-P-CCNC: 36 U/L (ref 10–44)
ANION GAP SERPL CALC-SCNC: 8 MMOL/L (ref 8–16)
AST SERPL-CCNC: 29 U/L (ref 10–40)
BASOPHILS # BLD AUTO: 0.08 K/UL (ref 0–0.2)
BASOPHILS NFR BLD: 1.4 % (ref 0–1.9)
BILIRUB SERPL-MCNC: 0.5 MG/DL (ref 0.1–1)
BUN SERPL-MCNC: 12 MG/DL (ref 8–23)
CALCIUM SERPL-MCNC: 9.7 MG/DL (ref 8.7–10.5)
CHLORIDE SERPL-SCNC: 103 MMOL/L (ref 95–110)
CHOLEST SERPL-MCNC: 324 MG/DL (ref 120–199)
CHOLEST/HDLC SERPL: 5.1 {RATIO} (ref 2–5)
CO2 SERPL-SCNC: 28 MMOL/L (ref 23–29)
COMPLEXED PSA SERPL-MCNC: 2.7 NG/ML (ref 0–4)
CREAT SERPL-MCNC: 0.8 MG/DL (ref 0.5–1.4)
DIFFERENTIAL METHOD: ABNORMAL
EOSINOPHIL # BLD AUTO: 0.1 K/UL (ref 0–0.5)
EOSINOPHIL NFR BLD: 2.5 % (ref 0–8)
ERYTHROCYTE [DISTWIDTH] IN BLOOD BY AUTOMATED COUNT: 13.5 % (ref 11.5–14.5)
EST. GFR  (AFRICAN AMERICAN): >60 ML/MIN/1.73 M^2
EST. GFR  (NON AFRICAN AMERICAN): >60 ML/MIN/1.73 M^2
ESTIMATED AVG GLUCOSE: 117 MG/DL (ref 68–131)
GLUCOSE SERPL-MCNC: 126 MG/DL (ref 70–110)
HBA1C MFR BLD: 5.7 % (ref 4–5.6)
HCT VFR BLD AUTO: 46.7 % (ref 40–54)
HDLC SERPL-MCNC: 64 MG/DL (ref 40–75)
HDLC SERPL: 19.8 % (ref 20–50)
HGB BLD-MCNC: 15.5 G/DL (ref 14–18)
IMM GRANULOCYTES # BLD AUTO: 0.02 K/UL (ref 0–0.04)
IMM GRANULOCYTES NFR BLD AUTO: 0.4 % (ref 0–0.5)
LDLC SERPL CALC-MCNC: 219.4 MG/DL (ref 63–159)
LYMPHOCYTES # BLD AUTO: 2.2 K/UL (ref 1–4.8)
LYMPHOCYTES NFR BLD: 38.9 % (ref 18–48)
MCH RBC QN AUTO: 31.2 PG (ref 27–31)
MCHC RBC AUTO-ENTMCNC: 33.2 G/DL (ref 32–36)
MCV RBC AUTO: 94 FL (ref 82–98)
MONOCYTES # BLD AUTO: 0.7 K/UL (ref 0.3–1)
MONOCYTES NFR BLD: 11.8 % (ref 4–15)
NEUTROPHILS # BLD AUTO: 2.5 K/UL (ref 1.8–7.7)
NEUTROPHILS NFR BLD: 45 % (ref 38–73)
NONHDLC SERPL-MCNC: 260 MG/DL
NRBC BLD-RTO: 0 /100 WBC
PLATELET # BLD AUTO: 265 K/UL (ref 150–450)
PMV BLD AUTO: 9.3 FL (ref 9.2–12.9)
POTASSIUM SERPL-SCNC: 4.1 MMOL/L (ref 3.5–5.1)
PROT SERPL-MCNC: 7.7 G/DL (ref 6–8.4)
RBC # BLD AUTO: 4.97 M/UL (ref 4.6–6.2)
SODIUM SERPL-SCNC: 139 MMOL/L (ref 136–145)
T4 FREE SERPL-MCNC: 1.05 NG/DL (ref 0.71–1.51)
TRIGL SERPL-MCNC: 203 MG/DL (ref 30–150)
TSH SERPL DL<=0.005 MIU/L-ACNC: 8.37 UIU/ML (ref 0.4–4)
WBC # BLD AUTO: 5.53 K/UL (ref 3.9–12.7)

## 2022-03-14 PROCEDURE — 83036 HEMOGLOBIN GLYCOSYLATED A1C: CPT | Mod: HCNC | Performed by: INTERNAL MEDICINE

## 2022-03-14 PROCEDURE — 36415 COLL VENOUS BLD VENIPUNCTURE: CPT | Mod: HCNC | Performed by: INTERNAL MEDICINE

## 2022-03-14 PROCEDURE — 80053 COMPREHEN METABOLIC PANEL: CPT | Mod: HCNC | Performed by: INTERNAL MEDICINE

## 2022-03-14 PROCEDURE — 84153 ASSAY OF PSA TOTAL: CPT | Mod: HCNC | Performed by: INTERNAL MEDICINE

## 2022-03-14 PROCEDURE — 84439 ASSAY OF FREE THYROXINE: CPT | Mod: HCNC | Performed by: INTERNAL MEDICINE

## 2022-03-14 PROCEDURE — 80061 LIPID PANEL: CPT | Mod: HCNC | Performed by: INTERNAL MEDICINE

## 2022-03-14 PROCEDURE — 85025 COMPLETE CBC W/AUTO DIFF WBC: CPT | Mod: HCNC | Performed by: INTERNAL MEDICINE

## 2022-03-14 PROCEDURE — 82306 VITAMIN D 25 HYDROXY: CPT | Mod: HCNC | Performed by: INTERNAL MEDICINE

## 2022-03-14 PROCEDURE — 84443 ASSAY THYROID STIM HORMONE: CPT | Mod: HCNC | Performed by: INTERNAL MEDICINE

## 2022-03-16 ENCOUNTER — OFFICE VISIT (OUTPATIENT)
Dept: FAMILY MEDICINE | Facility: CLINIC | Age: 67
End: 2022-03-16
Payer: COMMERCIAL

## 2022-03-16 VITALS
DIASTOLIC BLOOD PRESSURE: 82 MMHG | HEART RATE: 77 BPM | WEIGHT: 169.75 LBS | SYSTOLIC BLOOD PRESSURE: 120 MMHG | HEIGHT: 66 IN | OXYGEN SATURATION: 98 % | BODY MASS INDEX: 27.28 KG/M2

## 2022-03-16 DIAGNOSIS — E78.00 PURE HYPERCHOLESTEROLEMIA: Primary | ICD-10-CM

## 2022-03-16 DIAGNOSIS — K63.5 SERRATED POLYP OF COLON: ICD-10-CM

## 2022-03-16 DIAGNOSIS — R19.09 ABDOMINAL MASS OF OTHER SITE: ICD-10-CM

## 2022-03-16 DIAGNOSIS — E55.9 VITAMIN D DEFICIENCY: ICD-10-CM

## 2022-03-16 DIAGNOSIS — E03.9 HYPOTHYROIDISM, UNSPECIFIED TYPE: ICD-10-CM

## 2022-03-16 DIAGNOSIS — R73.03 PREDIABETES: ICD-10-CM

## 2022-03-16 PROCEDURE — 3288F FALL RISK ASSESSMENT DOCD: CPT | Mod: HCNC,CPTII,S$GLB, | Performed by: INTERNAL MEDICINE

## 2022-03-16 PROCEDURE — 3008F PR BODY MASS INDEX (BMI) DOCUMENTED: ICD-10-PCS | Mod: HCNC,CPTII,S$GLB, | Performed by: INTERNAL MEDICINE

## 2022-03-16 PROCEDURE — 1160F RVW MEDS BY RX/DR IN RCRD: CPT | Mod: HCNC,CPTII,S$GLB, | Performed by: INTERNAL MEDICINE

## 2022-03-16 PROCEDURE — 3079F PR MOST RECENT DIASTOLIC BLOOD PRESSURE 80-89 MM HG: ICD-10-PCS | Mod: HCNC,CPTII,S$GLB, | Performed by: INTERNAL MEDICINE

## 2022-03-16 PROCEDURE — 3074F SYST BP LT 130 MM HG: CPT | Mod: HCNC,CPTII,S$GLB, | Performed by: INTERNAL MEDICINE

## 2022-03-16 PROCEDURE — 3079F DIAST BP 80-89 MM HG: CPT | Mod: HCNC,CPTII,S$GLB, | Performed by: INTERNAL MEDICINE

## 2022-03-16 PROCEDURE — 1126F PR PAIN SEVERITY QUANTIFIED, NO PAIN PRESENT: ICD-10-PCS | Mod: HCNC,CPTII,S$GLB, | Performed by: INTERNAL MEDICINE

## 2022-03-16 PROCEDURE — 99999 PR PBB SHADOW E&M-EST. PATIENT-LVL IV: ICD-10-PCS | Mod: PBBFAC,HCNC,, | Performed by: INTERNAL MEDICINE

## 2022-03-16 PROCEDURE — 99214 PR OFFICE/OUTPT VISIT, EST, LEVL IV, 30-39 MIN: ICD-10-PCS | Mod: HCNC,S$GLB,, | Performed by: INTERNAL MEDICINE

## 2022-03-16 PROCEDURE — 3044F PR MOST RECENT HEMOGLOBIN A1C LEVEL <7.0%: ICD-10-PCS | Mod: HCNC,CPTII,S$GLB, | Performed by: INTERNAL MEDICINE

## 2022-03-16 PROCEDURE — 99214 OFFICE O/P EST MOD 30 MIN: CPT | Mod: HCNC,S$GLB,, | Performed by: INTERNAL MEDICINE

## 2022-03-16 PROCEDURE — 3074F PR MOST RECENT SYSTOLIC BLOOD PRESSURE < 130 MM HG: ICD-10-PCS | Mod: HCNC,CPTII,S$GLB, | Performed by: INTERNAL MEDICINE

## 2022-03-16 PROCEDURE — 1159F MED LIST DOCD IN RCRD: CPT | Mod: HCNC,CPTII,S$GLB, | Performed by: INTERNAL MEDICINE

## 2022-03-16 PROCEDURE — 3288F PR FALLS RISK ASSESSMENT DOCUMENTED: ICD-10-PCS | Mod: HCNC,CPTII,S$GLB, | Performed by: INTERNAL MEDICINE

## 2022-03-16 PROCEDURE — 1101F PT FALLS ASSESS-DOCD LE1/YR: CPT | Mod: HCNC,CPTII,S$GLB, | Performed by: INTERNAL MEDICINE

## 2022-03-16 PROCEDURE — 1159F PR MEDICATION LIST DOCUMENTED IN MEDICAL RECORD: ICD-10-PCS | Mod: HCNC,CPTII,S$GLB, | Performed by: INTERNAL MEDICINE

## 2022-03-16 PROCEDURE — 99999 PR PBB SHADOW E&M-EST. PATIENT-LVL IV: CPT | Mod: PBBFAC,HCNC,, | Performed by: INTERNAL MEDICINE

## 2022-03-16 PROCEDURE — 1126F AMNT PAIN NOTED NONE PRSNT: CPT | Mod: HCNC,CPTII,S$GLB, | Performed by: INTERNAL MEDICINE

## 2022-03-16 PROCEDURE — 3008F BODY MASS INDEX DOCD: CPT | Mod: HCNC,CPTII,S$GLB, | Performed by: INTERNAL MEDICINE

## 2022-03-16 PROCEDURE — 3044F HG A1C LEVEL LT 7.0%: CPT | Mod: HCNC,CPTII,S$GLB, | Performed by: INTERNAL MEDICINE

## 2022-03-16 PROCEDURE — 1101F PR PT FALLS ASSESS DOC 0-1 FALLS W/OUT INJ PAST YR: ICD-10-PCS | Mod: HCNC,CPTII,S$GLB, | Performed by: INTERNAL MEDICINE

## 2022-03-16 PROCEDURE — 1160F PR REVIEW ALL MEDS BY PRESCRIBER/CLIN PHARMACIST DOCUMENTED: ICD-10-PCS | Mod: HCNC,CPTII,S$GLB, | Performed by: INTERNAL MEDICINE

## 2022-03-16 NOTE — PROGRESS NOTES
Subjective:       Patient ID: Shaka Hatch is a 66 y.o. male.    Chief Complaint: Hypothyroidism      HPI  Shaka Hatch is a 66 y.o. male with chronic conditions of hyperlipidemia, hypothyroidism , vitamin-D deficiency, and found with a polyp at the appendiceal orifice  who presents today for follow-up.    Reports he had COVID in January.  Had COVID vaccines x2 prior his infection.  Presented fever, muscle aches, and sore throat with decreased appetite.  Seem to be improved in a few days but has remained with a version to some foods.  Has not been following his diet with increase carbs including pizza, bread, rice, and not taking his medications.  Stopped exercise when he used to walk 30-45 minutes minutes every day and doing stretching exercises.    His follow-up labs showing multiple abnormal findings:  - his CBC is within normal limits.   - his metabolic panel shows an elevated fasting blood glucose and his A1c is mildly elevated but stable.  - there was significant increase of his cholesterol, LDL, and triglycerides.  - vitamin-D is better but still low.  - his TSH is again increasing.  - his PSA is still within normal limits.    It to reports he has not been taking his medications.  Restarted his cholesterol medication and the thyroid medication when he has all his test results.  Planning to catch more sun for his vitamin-D.  Denies headaches, chest pains, palpitations, changes in bowel habits, or leg pains.  Does report some urinary changes but denies trouble, hesitance, or nocturia.     He received COVID vaccines x2  but declines the rest of the a vaccines.      Health Maintenance:  Health Maintenance   Topic Date Due    TETANUS VACCINE  Never done    Lipid Panel  03/14/2027    Hepatitis C Screening  Completed       Review of Systems   Constitutional: Negative for chills and fever.   HENT: Negative for rhinorrhea and sore throat.    Respiratory: Negative for shortness of breath.     Cardiovascular: Negative for chest pain and palpitations.   Endocrine: Negative for cold intolerance and heat intolerance.   Genitourinary: Positive for urgency.   Integumentary:  Positive for rash.   Neurological: Negative for headaches.   Psychiatric/Behavioral: Negative for dysphoric mood. The patient is not nervous/anxious.       Past Medical History:   Diagnosis Date    Hyperlipidemia     Thyroid disease     Vitamin D deficiency        Past Surgical History:   Procedure Laterality Date    COLONOSCOPY N/A 3/3/2021    Procedure: COLONOSCOPY suprep;  Surgeon: Evan Byers MD;  Location: [facility];  Service: Endoscopy;  Laterality: N/A;       Family History   Problem Relation Age of Onset    Diabetes Mellitus Mother     Hypertension Mother     Kidney cancer Mother     No Known Problems Father     Thyroid disease Sister     Chronic back pain Brother     Heart disease Maternal Grandmother        Social History     Socioeconomic History    Marital status: Single   Tobacco Use    Smoking status: Never Smoker    Smokeless tobacco: Never Used   Substance and Sexual Activity    Alcohol use: Not Currently    Drug use: Never    Sexual activity: Yes     Partners: Female       Current Outpatient Medications   Medication Sig Dispense Refill    aspirin (ECOTRIN) 81 MG EC tablet Take 1 tablet (81 mg total) by mouth every Mon, Wed, Fri. 12 tablet 11    atorvastatin (LIPITOR) 10 MG tablet Take 1 tablet (10 mg total) by mouth once daily. 90 tablet 3    ketoconazole (NIZORAL) 2 % cream Apply topically once daily. 30 g 6    levothyroxine (SYNTHROID) 50 MCG tablet Take 1 tablet (50 mcg total) by mouth before breakfast. 30 tablet 11    peg 400-propylene glycol, PF, (SYSTANE ULTRA, PF,) 0.4-0.3 % Dpet Apply 2 drops to eye once daily. 10 each 6    cholecalciferol, vitamin D3, (VITAMIN D3) 25 mcg (1,000 unit) capsule Take 1 capsule (1,000 Units total) by mouth once daily. (Patient not taking: Reported on  11/18/2021) 30 capsule 3     No current facility-administered medications for this visit.     Facility-Administered Medications Ordered in Other Visits   Medication Dose Route Frequency Provider Last Rate Last Admin    0.9%  NaCl infusion   Intravenous Continuous Evan Byers MD 20 mL/hr at 03/03/21 1232 New Bag at 03/03/21 1232    sodium chloride 0.9% flush 10 mL  10 mL Intravenous PRN Evan Byers MD           Review of patient's allergies indicates:  No Known Allergies      Objective:       Last 3 sets of Vitals    Vitals - 1 value per visit 11/18/2021 3/16/2022 3/16/2022   SYSTOLIC 110 - 120   DIASTOLIC 88 - 82   Pulse 74 - 77   Temp - - -   Resp - - -   SPO2 98 - 98   Weight (lb) 167.11 - 169.75   Weight (kg) 75.8 - 77   Height 66 - 66   BMI (Calculated) 27 - 27.4   VISIT REPORT - - -   Pain Score  - 0 -   Physical Exam  Constitutional:       General: He is not in acute distress.     Appearance: Normal appearance.   HENT:      Head: Normocephalic.      Right Ear: External ear normal.      Left Ear: Tympanic membrane, ear canal and external ear normal.      Nose: Nose normal.      Mouth/Throat:      Mouth: Mucous membranes are moist.   Eyes:      General: No scleral icterus.     Extraocular Movements: Extraocular movements intact.      Conjunctiva/sclera: Conjunctivae normal.      Pupils: Pupils are equal, round, and reactive to light.   Neck:      Vascular: No carotid bruit.      Comments: No goiter.  Cardiovascular:      Rate and Rhythm: Normal rate and regular rhythm.      Pulses: Normal pulses.      Heart sounds: Normal heart sounds.   Pulmonary:      Effort: Pulmonary effort is normal.      Breath sounds: Normal breath sounds.   Abdominal:      General: Bowel sounds are normal. There is no distension.      Palpations: Abdomen is soft. There is no mass.      Tenderness: There is no abdominal tenderness.   Musculoskeletal:         General: No swelling. Normal range of motion.      Cervical back:  Neck supple.   Lymphadenopathy:      Cervical: No cervical adenopathy.   Skin:     General: Skin is warm and dry.   Neurological:      General: No focal deficit present.      Mental Status: He is alert and oriented to person, place, and time.   Psychiatric:         Mood and Affect: Mood normal.         Behavior: Behavior normal.           CBC:  Recent Labs   Lab 12/17/20  0845 09/20/21  0812 03/14/22  0842   WBC 5.79 6.05 5.53   RBC 4.81 4.86 4.97   Hemoglobin 15.1 14.7 15.5   Hematocrit 45.5 44.6 46.7   Platelets 291 259 265   MCV 95 92 94   MCH 31.4 H 30.2 31.2 H   MCHC 33.2 33.0 33.2     CMP:  Recent Labs   Lab 09/20/21  0812 11/15/21  0824 03/14/22  0842   Glucose 111 H 122 H 126 H   Calcium 10.1 9.3 9.7   Albumin 4.2 4.3 4.2   Total Protein 7.5 7.7 7.7   Sodium 141 141 139   Potassium 4.5 4.0 4.1   CO2 26 26 28   Chloride 104 104 103   BUN 9 8 12   Creatinine 0.8 0.8 0.8   Alkaline Phosphatase 60 74 65   ALT 21 24 36   AST 23 26 29   Total Bilirubin 0.8 0.6 0.5     URINALYSIS:       LIPIDS:  Recent Labs   Lab 09/20/21  0812 11/15/21  0824 03/14/22  0842   TSH 11.436 H 5.767 H 8.371 H   HDL 62 69 64   Cholesterol 297 H 239 H 324 H   Triglycerides 230 H 154 H 203 H   LDL Cholesterol 189.0 H 139.2 219.4 H   HDL/Cholesterol Ratio 20.9 28.9 19.8 L   Non-HDL Cholesterol 235 170 260   Total Cholesterol/HDL Ratio 4.8 3.5 5.1 H     TSH:  Recent Labs   Lab 09/20/21  0812 11/15/21  0824 03/14/22  0842   TSH 11.436 H 5.767 H 8.371 H       A1C:  Recent Labs   Lab 03/26/21  0822 09/20/21  0812 03/14/22  0842   Hemoglobin A1C 5.7 H 5.7 H 5.7 H       Imaging:  No image results found.      Assessment:       1. Pure hypercholesterolemia    2. Hypothyroidism, unspecified type    3. Prediabetes    4. Serrated polyp of colon    5. Abdominal mass of other site    6. Vitamin D deficiency          Chronic conditions status updated as per HPI. Other than changes above, cont current medications and maintain follow up with specialist.  Return to clinic in 2 months.  Plan:       Shaka was seen today for hypothyroidism.    Diagnoses and all orders for this visit:    Pure hypercholesterolemia  -     Ambulatory referral/consult to Nutrition Services; Future  -     Lipid Panel; Future, re-evaluate in 8 weeks.  - restarted Lipitor.  May need higher strength.    Hypothyroidism, unspecified type  -     TSH; Future  -     T4, Free; Future  - restarted his thyroid medication.    Prediabetes  -     Ambulatory referral/consult to Nutrition Services; Future  -     Comprehensive Metabolic Panel; Future  - low carb diet, exercise, weight control.    Serrated polyp of colon/ Abdominal mass of other site  -     CT Abdomen Pelvis With Contrast; Future  - after last colonoscopy refused resection of affected area as he is interested in endoscopic procedure which is not available.  Would reconsider if appears to be enlarging.  Denies abdominal pain or changes in bowel habits.    Vitamin D deficiency   - restart supplements and planning to have sun exposure.      Health Maintenance Due   Topic Date Due    TETANUS VACCINE  Never done    Shingles Vaccine (1 of 2) Never done    Pneumococcal Vaccines (Age 65+) (1 of 1 - PPSV23) Never done    Influenza Vaccine (1) Never done    COVID-19 Vaccine (3 - Booster for Moderna series) 09/28/2021        Valencia Jurado MD  [facility]  Disclaimer:  This note has been generated using voice-recognition software. There may be grammatical or spelling errors that have been missed during proof-reading

## 2022-03-18 ENCOUNTER — TELEPHONE (OUTPATIENT)
Dept: FAMILY MEDICINE | Facility: CLINIC | Age: 67
End: 2022-03-18
Payer: COMMERCIAL

## 2022-04-08 ENCOUNTER — HOSPITAL ENCOUNTER (OUTPATIENT)
Dept: RADIOLOGY | Facility: HOSPITAL | Age: 67
Discharge: HOME | End: 2022-04-08
Attending: INTERNAL MEDICINE
Payer: MEDICARE

## 2022-04-08 DIAGNOSIS — R19.09 ABDOMINAL MASS OF OTHER SITE: ICD-10-CM

## 2022-04-08 PROCEDURE — 74177 CT ABD & PELVIS W/CONTRAST: CPT | Mod: TC,HCNC

## 2022-04-08 PROCEDURE — 74177 CT ABDOMEN PELVIS WITH CONTRAST: ICD-10-PCS | Mod: 26,HCNC,, | Performed by: RADIOLOGY

## 2022-04-08 PROCEDURE — 74177 CT ABD & PELVIS W/CONTRAST: CPT | Mod: 26,HCNC,, | Performed by: RADIOLOGY

## 2022-04-08 PROCEDURE — 25500020 PHARM REV CODE 255: Mod: HCNC | Performed by: INTERNAL MEDICINE

## 2022-04-08 RX ADMIN — IOHEXOL 15 ML: 350 INJECTION, SOLUTION INTRAVENOUS at 03:04

## 2022-04-08 RX ADMIN — IOHEXOL 75 ML: 350 INJECTION, SOLUTION INTRAVENOUS at 03:04

## 2022-05-09 ENCOUNTER — PATIENT MESSAGE (OUTPATIENT)
Dept: FAMILY MEDICINE | Facility: CLINIC | Age: 67
End: 2022-05-09
Payer: COMMERCIAL

## 2022-05-17 ENCOUNTER — LAB VISIT (OUTPATIENT)
Dept: LAB | Facility: HOSPITAL | Age: 67
End: 2022-05-17
Attending: INTERNAL MEDICINE
Payer: COMMERCIAL

## 2022-05-17 DIAGNOSIS — E03.9 HYPOTHYROIDISM, UNSPECIFIED TYPE: ICD-10-CM

## 2022-05-17 DIAGNOSIS — R73.03 PREDIABETES: ICD-10-CM

## 2022-05-17 DIAGNOSIS — E78.00 PURE HYPERCHOLESTEROLEMIA: ICD-10-CM

## 2022-05-17 LAB
ALBUMIN SERPL BCP-MCNC: 3.7 G/DL (ref 3.5–5.2)
ALP SERPL-CCNC: 60 U/L (ref 55–135)
ALT SERPL W/O P-5'-P-CCNC: 29 U/L (ref 10–44)
ANION GAP SERPL CALC-SCNC: 9 MMOL/L (ref 8–16)
AST SERPL-CCNC: 30 U/L (ref 10–40)
BILIRUB SERPL-MCNC: 0.7 MG/DL (ref 0.1–1)
BUN SERPL-MCNC: 9 MG/DL (ref 8–23)
CALCIUM SERPL-MCNC: 9.2 MG/DL (ref 8.7–10.5)
CHLORIDE SERPL-SCNC: 106 MMOL/L (ref 95–110)
CHOLEST SERPL-MCNC: 191 MG/DL (ref 120–199)
CHOLEST/HDLC SERPL: 3.7 {RATIO} (ref 2–5)
CO2 SERPL-SCNC: 28 MMOL/L (ref 23–29)
CREAT SERPL-MCNC: 0.8 MG/DL (ref 0.5–1.4)
EST. GFR  (AFRICAN AMERICAN): >60 ML/MIN/1.73 M^2
EST. GFR  (NON AFRICAN AMERICAN): >60 ML/MIN/1.73 M^2
GLUCOSE SERPL-MCNC: 116 MG/DL (ref 70–110)
HDLC SERPL-MCNC: 52 MG/DL (ref 40–75)
HDLC SERPL: 27.2 % (ref 20–50)
LDLC SERPL CALC-MCNC: 98 MG/DL (ref 63–159)
NONHDLC SERPL-MCNC: 139 MG/DL
POTASSIUM SERPL-SCNC: 4.5 MMOL/L (ref 3.5–5.1)
PROT SERPL-MCNC: 6.7 G/DL (ref 6–8.4)
SODIUM SERPL-SCNC: 143 MMOL/L (ref 136–145)
T4 FREE SERPL-MCNC: 0.95 NG/DL (ref 0.71–1.51)
TRIGL SERPL-MCNC: 205 MG/DL (ref 30–150)
TSH SERPL DL<=0.005 MIU/L-ACNC: 6.04 UIU/ML (ref 0.4–4)

## 2022-05-17 PROCEDURE — 80061 LIPID PANEL: CPT | Mod: HCNC | Performed by: INTERNAL MEDICINE

## 2022-05-17 PROCEDURE — 80053 COMPREHEN METABOLIC PANEL: CPT | Mod: HCNC | Performed by: INTERNAL MEDICINE

## 2022-05-17 PROCEDURE — 36415 COLL VENOUS BLD VENIPUNCTURE: CPT | Mod: HCNC | Performed by: INTERNAL MEDICINE

## 2022-05-17 PROCEDURE — 84439 ASSAY OF FREE THYROXINE: CPT | Mod: HCNC | Performed by: INTERNAL MEDICINE

## 2022-05-17 PROCEDURE — 84443 ASSAY THYROID STIM HORMONE: CPT | Mod: HCNC | Performed by: INTERNAL MEDICINE

## 2022-05-19 ENCOUNTER — OFFICE VISIT (OUTPATIENT)
Dept: FAMILY MEDICINE | Facility: CLINIC | Age: 67
End: 2022-05-19
Payer: COMMERCIAL

## 2022-05-19 VITALS
HEIGHT: 66 IN | HEART RATE: 68 BPM | OXYGEN SATURATION: 98 % | DIASTOLIC BLOOD PRESSURE: 68 MMHG | SYSTOLIC BLOOD PRESSURE: 110 MMHG | BODY MASS INDEX: 27.07 KG/M2 | WEIGHT: 168.44 LBS

## 2022-05-19 DIAGNOSIS — E78.00 PURE HYPERCHOLESTEROLEMIA: Primary | ICD-10-CM

## 2022-05-19 DIAGNOSIS — N40.0 ENLARGED PROSTATE: ICD-10-CM

## 2022-05-19 DIAGNOSIS — E03.9 HYPOTHYROIDISM, UNSPECIFIED TYPE: ICD-10-CM

## 2022-05-19 DIAGNOSIS — K63.5 SERRATED POLYP OF COLON: ICD-10-CM

## 2022-05-19 DIAGNOSIS — R73.03 PREDIABETES: ICD-10-CM

## 2022-05-19 DIAGNOSIS — Z79.899 MEDICATION MANAGEMENT: ICD-10-CM

## 2022-05-19 PROCEDURE — 99999 PR PBB SHADOW E&M-EST. PATIENT-LVL IV: CPT | Mod: PBBFAC,HCNC,, | Performed by: INTERNAL MEDICINE

## 2022-05-19 PROCEDURE — 99214 PR OFFICE/OUTPT VISIT, EST, LEVL IV, 30-39 MIN: ICD-10-PCS | Mod: HCNC,S$GLB,, | Performed by: INTERNAL MEDICINE

## 2022-05-19 PROCEDURE — 1126F AMNT PAIN NOTED NONE PRSNT: CPT | Mod: HCNC,CPTII,S$GLB, | Performed by: INTERNAL MEDICINE

## 2022-05-19 PROCEDURE — 99214 OFFICE O/P EST MOD 30 MIN: CPT | Mod: HCNC,S$GLB,, | Performed by: INTERNAL MEDICINE

## 2022-05-19 PROCEDURE — 99999 PR PBB SHADOW E&M-EST. PATIENT-LVL IV: ICD-10-PCS | Mod: PBBFAC,HCNC,, | Performed by: INTERNAL MEDICINE

## 2022-05-19 PROCEDURE — 3044F HG A1C LEVEL LT 7.0%: CPT | Mod: HCNC,CPTII,S$GLB, | Performed by: INTERNAL MEDICINE

## 2022-05-19 PROCEDURE — 3078F PR MOST RECENT DIASTOLIC BLOOD PRESSURE < 80 MM HG: ICD-10-PCS | Mod: HCNC,CPTII,S$GLB, | Performed by: INTERNAL MEDICINE

## 2022-05-19 PROCEDURE — 1159F MED LIST DOCD IN RCRD: CPT | Mod: HCNC,CPTII,S$GLB, | Performed by: INTERNAL MEDICINE

## 2022-05-19 PROCEDURE — 3008F BODY MASS INDEX DOCD: CPT | Mod: HCNC,CPTII,S$GLB, | Performed by: INTERNAL MEDICINE

## 2022-05-19 PROCEDURE — 3078F DIAST BP <80 MM HG: CPT | Mod: HCNC,CPTII,S$GLB, | Performed by: INTERNAL MEDICINE

## 2022-05-19 PROCEDURE — 1159F PR MEDICATION LIST DOCUMENTED IN MEDICAL RECORD: ICD-10-PCS | Mod: HCNC,CPTII,S$GLB, | Performed by: INTERNAL MEDICINE

## 2022-05-19 PROCEDURE — 1160F RVW MEDS BY RX/DR IN RCRD: CPT | Mod: HCNC,CPTII,S$GLB, | Performed by: INTERNAL MEDICINE

## 2022-05-19 PROCEDURE — 3074F SYST BP LT 130 MM HG: CPT | Mod: HCNC,CPTII,S$GLB, | Performed by: INTERNAL MEDICINE

## 2022-05-19 PROCEDURE — 3074F PR MOST RECENT SYSTOLIC BLOOD PRESSURE < 130 MM HG: ICD-10-PCS | Mod: HCNC,CPTII,S$GLB, | Performed by: INTERNAL MEDICINE

## 2022-05-19 PROCEDURE — 3008F PR BODY MASS INDEX (BMI) DOCUMENTED: ICD-10-PCS | Mod: HCNC,CPTII,S$GLB, | Performed by: INTERNAL MEDICINE

## 2022-05-19 PROCEDURE — 3044F PR MOST RECENT HEMOGLOBIN A1C LEVEL <7.0%: ICD-10-PCS | Mod: HCNC,CPTII,S$GLB, | Performed by: INTERNAL MEDICINE

## 2022-05-19 PROCEDURE — 1160F PR REVIEW ALL MEDS BY PRESCRIBER/CLIN PHARMACIST DOCUMENTED: ICD-10-PCS | Mod: HCNC,CPTII,S$GLB, | Performed by: INTERNAL MEDICINE

## 2022-05-19 PROCEDURE — 1126F PR PAIN SEVERITY QUANTIFIED, NO PAIN PRESENT: ICD-10-PCS | Mod: HCNC,CPTII,S$GLB, | Performed by: INTERNAL MEDICINE

## 2022-05-19 NOTE — PROGRESS NOTES
Subjective:       Patient ID: Shaka Hatch is a 66 y.o. male.    Chief Complaint: Results (On labs )      HPI  Shaka Hatch is a 66 y.o. male with chronic conditions of hyperlipidemia, hypothyroidism , vitamin-D deficiency, and found with a polyp at the appendiceal orifice who presents today for routine health maintenance.    Reports overall feels okay.  Tolerating statins.  Has decrease the breads and drinking more liquids.  Denies changes in BM, chest pains, palpitations, lightheadedness, or fatigue.    Labs showing stable metabolic panel with improved lipid panel.  Had CT scan which did not show significant intestinal changes.  Liver hemangioma and enlarged prostate noted.  Labs with normal liver function test. Denies trouble urinating but some dribbling.    Health Maintenance:  Health Maintenance   Topic Date Due    TETANUS VACCINE  Never done    Lipid Panel  05/17/2027    Hepatitis C Screening  Completed       Review of Systems   Constitutional: Negative for appetite change, chills, fatigue and fever.   HENT: Negative for nasal congestion, hearing loss, rhinorrhea and sore throat.    Eyes: Negative for redness and visual disturbance.   Respiratory: Negative for cough and shortness of breath.    Cardiovascular: Negative for chest pain, palpitations, leg swelling and claudication.   Gastrointestinal: Negative for abdominal pain, change in bowel habit, constipation, diarrhea, nausea, vomiting and change in bowel habit.   Genitourinary: Positive for frequency (With hesitance and dribbling, no nocturia). Negative for bladder incontinence, difficulty urinating and dysuria.   Neurological: Negative for dizziness, weakness, light-headedness, numbness, headaches and memory loss.   Hematological: Does not bruise/bleed easily.   Psychiatric/Behavioral: Negative for sleep disturbance and suicidal ideas. The patient is not nervous/anxious.       Past Medical History:   Diagnosis Date    Hyperlipidemia      Thyroid disease     Vitamin D deficiency        Past Surgical History:   Procedure Laterality Date    COLONOSCOPY N/A 3/3/2021    Procedure: COLONOSCOPY suprep;  Surgeon: Evan Byers MD;  Location: [facility];  Service: Endoscopy;  Laterality: N/A;       Family History   Problem Relation Age of Onset    Diabetes Mellitus Mother     Hypertension Mother     Kidney cancer Mother     No Known Problems Father     Thyroid disease Sister     Chronic back pain Brother     Heart disease Maternal Grandmother        Social History     Socioeconomic History    Marital status: Single   Tobacco Use    Smoking status: Never Smoker    Smokeless tobacco: Never Used   Substance and Sexual Activity    Alcohol use: Not Currently    Drug use: Never    Sexual activity: Yes     Partners: Female       Current Outpatient Medications   Medication Sig Dispense Refill    aspirin (ECOTRIN) 81 MG EC tablet Take 1 tablet (81 mg total) by mouth every Mon, Wed, Fri. 12 tablet 11    atorvastatin (LIPITOR) 10 MG tablet Take 1 tablet (10 mg total) by mouth once daily. 90 tablet 3    cholecalciferol, vitamin D3, (VITAMIN D3) 25 mcg (1,000 unit) capsule Take 1 capsule (1,000 Units total) by mouth once daily. 30 capsule 3    ketoconazole (NIZORAL) 2 % cream Apply topically once daily. 30 g 6    levothyroxine (SYNTHROID) 50 MCG tablet Take 1 tablet (50 mcg total) by mouth before breakfast. 30 tablet 11    peg 400-propylene glycol, PF, (SYSTANE ULTRA, PF,) 0.4-0.3 % Dpet Apply 2 drops to eye once daily. 10 each 6     No current facility-administered medications for this visit.     Facility-Administered Medications Ordered in Other Visits   Medication Dose Route Frequency Provider Last Rate Last Admin    0.9%  NaCl infusion   Intravenous Continuous Evan Byers MD 20 mL/hr at 03/03/21 1232 New Bag at 03/03/21 1232    sodium chloride 0.9% flush 10 mL  10 mL Intravenous PRN Evan Byers MD           Review of patient's  allergies indicates:  No Known Allergies      Objective:       Last 3 sets of Vitals    Vitals - 1 value per visit 3/16/2022 5/19/2022 5/19/2022   SYSTOLIC 120 - 110   DIASTOLIC 82 - 68   Pulse 77 - 68   Temp - - -   Resp - - -   SPO2 98 - 98   Weight (lb) 169.75 - 168.43   Weight (kg) 77 - 76.4   Height 66 - 66   BMI (Calculated) 27.4 - 27.2   VISIT REPORT - - -   Pain Score  - 0 -   Physical Exam  Constitutional:       General: He is not in acute distress.     Appearance: Normal appearance.   HENT:      Head: Normocephalic.   Eyes:      General: No scleral icterus.     Extraocular Movements: Extraocular movements intact.      Conjunctiva/sclera: Conjunctivae normal.   Neck:      Comments: No goiter.  Cardiovascular:      Rate and Rhythm: Normal rate and regular rhythm.      Pulses: Normal pulses.      Heart sounds: Normal heart sounds.   Pulmonary:      Effort: Pulmonary effort is normal.      Breath sounds: Normal breath sounds.   Abdominal:      General: Bowel sounds are normal. There is no distension.      Palpations: Abdomen is soft. There is no mass.      Tenderness: There is no abdominal tenderness. There is no right CVA tenderness or left CVA tenderness.   Musculoskeletal:         General: No swelling. Normal range of motion.   Lymphadenopathy:      Cervical: No cervical adenopathy.   Skin:     General: Skin is warm and dry.   Neurological:      General: No focal deficit present.      Mental Status: He is alert and oriented to person, place, and time.   Psychiatric:         Mood and Affect: Mood normal.         Behavior: Behavior normal.           CBC:  Recent Labs   Lab 12/17/20  0845 09/20/21  0812 03/14/22  0842   WBC 5.79 6.05 5.53   RBC 4.81 4.86 4.97   Hemoglobin 15.1 14.7 15.5   Hematocrit 45.5 44.6 46.7   Platelets 291 259 265   MCV 95 92 94   MCH 31.4 H 30.2 31.2 H   MCHC 33.2 33.0 33.2     CMP:  Recent Labs   Lab 11/15/21  0824 03/14/22  0842 05/17/22  0730   Glucose 122 H 126 H 116 H   Calcium  9.3 9.7 9.2   Albumin 4.3 4.2 3.7   Total Protein 7.7 7.7 6.7   Sodium 141 139 143   Potassium 4.0 4.1 4.5   CO2 26 28 28   Chloride 104 103 106   BUN 8 12 9   Creatinine 0.8 0.8 0.8   Alkaline Phosphatase 74 65 60   ALT 24 36 29   AST 26 29 30   Total Bilirubin 0.6 0.5 0.7     URINALYSIS:       LIPIDS:  Recent Labs   Lab 11/15/21  0824 03/14/22  0842 05/17/22  0730   TSH 5.767 H 8.371 H 6.035 H   HDL 69 64 52   Cholesterol 239 H 324 H 191   Triglycerides 154 H 203 H 205 H   LDL Cholesterol 139.2 219.4 H 98.0   HDL/Cholesterol Ratio 28.9 19.8 L 27.2   Non-HDL Cholesterol 170 260 139   Total Cholesterol/HDL Ratio 3.5 5.1 H 3.7     TSH:  Recent Labs   Lab 11/15/21  0824 03/14/22  0842 05/17/22  0730   TSH 5.767 H 8.371 H 6.035 H       A1C:  Recent Labs   Lab 03/26/21  0822 09/20/21  0812 03/14/22  0842   Hemoglobin A1C 5.7 H 5.7 H 5.7 H       Imaging:  CT Abdomen Pelvis With Contrast  Narrative: EXAMINATION:  CT ABDOMEN PELVIS WITH CONTRAST    CLINICAL HISTORY:  Colon polyp, cancerous; Other intra-abdominal and pelvic swelling, mass and lump    TECHNIQUE:  Low dose axial images, sagittal and coronal reformations were obtained from the lung bases to the pubic symphysis following the IV administration of 75 mL of Omnipaque 350 and the oral administration of 30 mL of Omnipaque 350.    COMPARISON:  None    FINDINGS:  Heart: Normal in size. No pericardial effusion.    Lung Bases: Bilateral dependent atelectasis.  Consolidation or pleural fluid.    Liver: Normal in size and overall attenuation.  There is a thin hyperdensity within the right subcapsular liver margin, likely relating to variable perfusion.    Gallbladder: No calcified gallstones.    Bile Ducts: No evidence of dilated ducts.    Pancreas: No mass or peripancreatic fat stranding.    Spleen: Small accessory spleen.    Adrenals: Unremarkable.    Kidneys/ Ureters: Normal size and location.  No hydroureteronephrosis.  No mass.    Bladder: No evidence of wall  thickening.    Reproductive organs: Mild prostatomegaly with dystrophic calcification.    GI Tract/Mesentery: No evidence of bowel obstruction or inflammation.    Peritoneal Space: No ascites. No free air.    Retroperitoneum: No significant adenopathy.    Abdominal wall: Unremarkable.    Vasculature: No significant atherosclerosis or aneurysm.    Bones: Degenerative changes of the spine.  Sclerotic focus of the left lateral T9 vertebral body, likely relating to bone island..  No acute fracture.  Impression: Thin hyperdensity within the right subcapsular liver margin, likely relating to variable perfusion or small hemangioma.    Mild prostatomegaly with dystrophic calcification.    Additional findings above.    Electronically signed by resident: Bárbara Salazar  Date:    04/08/2022  Time:    15:16    Electronically signed by: Stephen Santo MD  Date:    04/08/2022  Time:    15:50      Assessment:       1. Pure hypercholesterolemia    2. Hypothyroidism, unspecified type    3. Prediabetes    4. Serrated polyp of colon    5. Medication management          Chronic conditions status updated as per HPI. Other than changes above, cont current medications and maintain follow up with specialist. Return to clinic in 6 months.  Plan:       Shaka was seen today for results.    Diagnoses and all orders for this visit:    Pure hypercholesterolemia  -     Lipid Panel; Future  -     T4, FREE; Future  - Labs better. Continue to monitor.    Hypothyroidism, unspecified type  -     TSH; Future  -- Labs better. Consider increasing dose to 75 mcg daily.    Prediabetes  -     Hemoglobin A1C; Future  -     Comprehensive Metabolic Panel; Future  - Lifestyle modifications    Serrated polyp of colon   - Does not want surgery. We discussed colon cancer screening test and wants to wait until next visit.    Enlarged Prostate   - Urology eval.    Other orders  -     carboxymethylcellulose-glycern 0.5-0.9 % Drop; Apply 2 drops to eye 3 (three)  times daily.      Health Maintenance Due   Topic Date Due    TETANUS VACCINE  Never done    Shingles Vaccine (1 of 2) Never done    Pneumococcal Vaccines (Age 65+) (1 - PCV) Never done    COVID-19 Vaccine (3 - Booster for Moderna series) 09/28/2021        Valencia Jurado MD  [facility]  Disclaimer:  This note has been generated using voice-recognition software. There may be grammatical or spelling errors that have been missed during proof-reading

## 2022-05-21 ENCOUNTER — PATIENT MESSAGE (OUTPATIENT)
Dept: FAMILY MEDICINE | Facility: CLINIC | Age: 67
End: 2022-05-21
Payer: COMMERCIAL

## 2022-05-21 RX ORDER — LEVOTHYROXINE SODIUM 75 UG/1
75 TABLET ORAL
Qty: 90 TABLET | Refills: 2 | Status: SHIPPED | OUTPATIENT
Start: 2022-05-21 | End: 2023-06-23 | Stop reason: SDUPTHER

## 2022-11-04 ENCOUNTER — PATIENT MESSAGE (OUTPATIENT)
Dept: FAMILY MEDICINE | Facility: CLINIC | Age: 67
End: 2022-11-04
Payer: MEDICARE

## 2023-01-16 ENCOUNTER — PATIENT MESSAGE (OUTPATIENT)
Dept: FAMILY MEDICINE | Facility: CLINIC | Age: 68
End: 2023-01-16
Payer: MEDICARE

## 2023-02-07 DIAGNOSIS — Z00.00 ENCOUNTER FOR MEDICARE ANNUAL WELLNESS EXAM: ICD-10-CM

## 2023-02-09 DIAGNOSIS — Z00.00 ENCOUNTER FOR MEDICARE ANNUAL WELLNESS EXAM: ICD-10-CM

## 2023-03-24 ENCOUNTER — PATIENT MESSAGE (OUTPATIENT)
Dept: FAMILY MEDICINE | Facility: CLINIC | Age: 68
End: 2023-03-24
Payer: MEDICARE

## 2023-03-30 ENCOUNTER — PATIENT MESSAGE (OUTPATIENT)
Dept: FAMILY MEDICINE | Facility: CLINIC | Age: 68
End: 2023-03-30
Payer: MEDICARE

## 2023-04-04 ENCOUNTER — LAB VISIT (OUTPATIENT)
Dept: LAB | Facility: HOSPITAL | Age: 68
End: 2023-04-04
Attending: INTERNAL MEDICINE
Payer: MEDICARE

## 2023-04-04 DIAGNOSIS — E78.00 PURE HYPERCHOLESTEROLEMIA: ICD-10-CM

## 2023-04-04 DIAGNOSIS — E03.9 HYPOTHYROIDISM, UNSPECIFIED TYPE: ICD-10-CM

## 2023-04-04 DIAGNOSIS — R73.03 PREDIABETES: ICD-10-CM

## 2023-04-04 LAB
ALBUMIN SERPL BCP-MCNC: 4.1 G/DL (ref 3.5–5.2)
ALP SERPL-CCNC: 68 U/L (ref 55–135)
ALT SERPL W/O P-5'-P-CCNC: 40 U/L (ref 10–44)
ANION GAP SERPL CALC-SCNC: 9 MMOL/L (ref 8–16)
AST SERPL-CCNC: 34 U/L (ref 10–40)
BILIRUB SERPL-MCNC: 0.5 MG/DL (ref 0.1–1)
BUN SERPL-MCNC: 11 MG/DL (ref 8–23)
CALCIUM SERPL-MCNC: 9.7 MG/DL (ref 8.7–10.5)
CHLORIDE SERPL-SCNC: 105 MMOL/L (ref 95–110)
CHOLEST SERPL-MCNC: 293 MG/DL (ref 120–199)
CHOLEST/HDLC SERPL: 5.1 {RATIO} (ref 2–5)
CO2 SERPL-SCNC: 27 MMOL/L (ref 23–29)
CREAT SERPL-MCNC: 0.8 MG/DL (ref 0.5–1.4)
EST. GFR  (NO RACE VARIABLE): >60 ML/MIN/1.73 M^2
ESTIMATED AVG GLUCOSE: 120 MG/DL (ref 68–131)
GLUCOSE SERPL-MCNC: 109 MG/DL (ref 70–110)
HBA1C MFR BLD: 5.8 % (ref 4–5.6)
HDLC SERPL-MCNC: 58 MG/DL (ref 40–75)
HDLC SERPL: 19.8 % (ref 20–50)
LDLC SERPL CALC-MCNC: 202.8 MG/DL (ref 63–159)
NONHDLC SERPL-MCNC: 235 MG/DL
POTASSIUM SERPL-SCNC: 4.7 MMOL/L (ref 3.5–5.1)
PROT SERPL-MCNC: 7.1 G/DL (ref 6–8.4)
SODIUM SERPL-SCNC: 141 MMOL/L (ref 136–145)
T4 FREE SERPL-MCNC: 0.81 NG/DL (ref 0.71–1.51)
TRIGL SERPL-MCNC: 161 MG/DL (ref 30–150)
TSH SERPL DL<=0.005 MIU/L-ACNC: 9.85 UIU/ML (ref 0.4–4)

## 2023-04-04 PROCEDURE — 84439 ASSAY OF FREE THYROXINE: CPT | Mod: HCNC | Performed by: INTERNAL MEDICINE

## 2023-04-04 PROCEDURE — 80053 COMPREHEN METABOLIC PANEL: CPT | Mod: HCNC | Performed by: INTERNAL MEDICINE

## 2023-04-04 PROCEDURE — 36415 COLL VENOUS BLD VENIPUNCTURE: CPT | Mod: HCNC | Performed by: INTERNAL MEDICINE

## 2023-04-04 PROCEDURE — 80061 LIPID PANEL: CPT | Mod: HCNC | Performed by: INTERNAL MEDICINE

## 2023-04-04 PROCEDURE — 83036 HEMOGLOBIN GLYCOSYLATED A1C: CPT | Mod: HCNC | Performed by: INTERNAL MEDICINE

## 2023-04-04 PROCEDURE — 84443 ASSAY THYROID STIM HORMONE: CPT | Mod: HCNC | Performed by: INTERNAL MEDICINE

## 2023-04-08 ENCOUNTER — PATIENT MESSAGE (OUTPATIENT)
Dept: FAMILY MEDICINE | Facility: CLINIC | Age: 68
End: 2023-04-08
Payer: MEDICARE

## 2023-04-12 ENCOUNTER — OFFICE VISIT (OUTPATIENT)
Dept: FAMILY MEDICINE | Facility: CLINIC | Age: 68
End: 2023-04-12
Payer: MEDICARE

## 2023-04-12 VITALS
SYSTOLIC BLOOD PRESSURE: 134 MMHG | DIASTOLIC BLOOD PRESSURE: 78 MMHG | HEIGHT: 66 IN | BODY MASS INDEX: 27.81 KG/M2 | OXYGEN SATURATION: 99 % | WEIGHT: 173.06 LBS | HEART RATE: 81 BPM

## 2023-04-12 DIAGNOSIS — E78.00 PURE HYPERCHOLESTEROLEMIA: Primary | ICD-10-CM

## 2023-04-12 DIAGNOSIS — M25.512 ACUTE PAIN OF BOTH SHOULDERS: ICD-10-CM

## 2023-04-12 DIAGNOSIS — R73.03 PREDIABETES: ICD-10-CM

## 2023-04-12 DIAGNOSIS — K63.5 SERRATED POLYP OF COLON: ICD-10-CM

## 2023-04-12 DIAGNOSIS — Z12.5 PROSTATE CANCER SCREENING: ICD-10-CM

## 2023-04-12 DIAGNOSIS — M25.511 ACUTE PAIN OF BOTH SHOULDERS: ICD-10-CM

## 2023-04-12 DIAGNOSIS — E03.9 HYPOTHYROIDISM, UNSPECIFIED TYPE: ICD-10-CM

## 2023-04-12 PROCEDURE — 1101F PR PT FALLS ASSESS DOC 0-1 FALLS W/OUT INJ PAST YR: ICD-10-PCS | Mod: HCNC,CPTII,S$GLB, | Performed by: INTERNAL MEDICINE

## 2023-04-12 PROCEDURE — 1159F MED LIST DOCD IN RCRD: CPT | Mod: HCNC,CPTII,S$GLB, | Performed by: INTERNAL MEDICINE

## 2023-04-12 PROCEDURE — 1126F PR PAIN SEVERITY QUANTIFIED, NO PAIN PRESENT: ICD-10-PCS | Mod: HCNC,CPTII,S$GLB, | Performed by: INTERNAL MEDICINE

## 2023-04-12 PROCEDURE — 1101F PT FALLS ASSESS-DOCD LE1/YR: CPT | Mod: HCNC,CPTII,S$GLB, | Performed by: INTERNAL MEDICINE

## 2023-04-12 PROCEDURE — 3288F FALL RISK ASSESSMENT DOCD: CPT | Mod: HCNC,CPTII,S$GLB, | Performed by: INTERNAL MEDICINE

## 2023-04-12 PROCEDURE — 3288F PR FALLS RISK ASSESSMENT DOCUMENTED: ICD-10-PCS | Mod: HCNC,CPTII,S$GLB, | Performed by: INTERNAL MEDICINE

## 2023-04-12 PROCEDURE — 3075F SYST BP GE 130 - 139MM HG: CPT | Mod: HCNC,CPTII,S$GLB, | Performed by: INTERNAL MEDICINE

## 2023-04-12 PROCEDURE — 99999 PR PBB SHADOW E&M-EST. PATIENT-LVL IV: ICD-10-PCS | Mod: PBBFAC,HCNC,, | Performed by: INTERNAL MEDICINE

## 2023-04-12 PROCEDURE — 1126F AMNT PAIN NOTED NONE PRSNT: CPT | Mod: HCNC,CPTII,S$GLB, | Performed by: INTERNAL MEDICINE

## 2023-04-12 PROCEDURE — 1160F PR REVIEW ALL MEDS BY PRESCRIBER/CLIN PHARMACIST DOCUMENTED: ICD-10-PCS | Mod: HCNC,CPTII,S$GLB, | Performed by: INTERNAL MEDICINE

## 2023-04-12 PROCEDURE — 3075F PR MOST RECENT SYSTOLIC BLOOD PRESS GE 130-139MM HG: ICD-10-PCS | Mod: HCNC,CPTII,S$GLB, | Performed by: INTERNAL MEDICINE

## 2023-04-12 PROCEDURE — 3078F PR MOST RECENT DIASTOLIC BLOOD PRESSURE < 80 MM HG: ICD-10-PCS | Mod: HCNC,CPTII,S$GLB, | Performed by: INTERNAL MEDICINE

## 2023-04-12 PROCEDURE — 1159F PR MEDICATION LIST DOCUMENTED IN MEDICAL RECORD: ICD-10-PCS | Mod: HCNC,CPTII,S$GLB, | Performed by: INTERNAL MEDICINE

## 2023-04-12 PROCEDURE — 3008F PR BODY MASS INDEX (BMI) DOCUMENTED: ICD-10-PCS | Mod: HCNC,CPTII,S$GLB, | Performed by: INTERNAL MEDICINE

## 2023-04-12 PROCEDURE — 99999 PR PBB SHADOW E&M-EST. PATIENT-LVL IV: CPT | Mod: PBBFAC,HCNC,, | Performed by: INTERNAL MEDICINE

## 2023-04-12 PROCEDURE — 99397 PER PM REEVAL EST PAT 65+ YR: CPT | Mod: HCNC,S$GLB,, | Performed by: INTERNAL MEDICINE

## 2023-04-12 PROCEDURE — 3008F BODY MASS INDEX DOCD: CPT | Mod: HCNC,CPTII,S$GLB, | Performed by: INTERNAL MEDICINE

## 2023-04-12 PROCEDURE — 3044F HG A1C LEVEL LT 7.0%: CPT | Mod: HCNC,CPTII,S$GLB, | Performed by: INTERNAL MEDICINE

## 2023-04-12 PROCEDURE — 99397 PR PREVENTIVE VISIT,EST,65 & OVER: ICD-10-PCS | Mod: HCNC,S$GLB,, | Performed by: INTERNAL MEDICINE

## 2023-04-12 PROCEDURE — 1160F RVW MEDS BY RX/DR IN RCRD: CPT | Mod: HCNC,CPTII,S$GLB, | Performed by: INTERNAL MEDICINE

## 2023-04-12 PROCEDURE — 3078F DIAST BP <80 MM HG: CPT | Mod: HCNC,CPTII,S$GLB, | Performed by: INTERNAL MEDICINE

## 2023-04-12 PROCEDURE — 3044F PR MOST RECENT HEMOGLOBIN A1C LEVEL <7.0%: ICD-10-PCS | Mod: HCNC,CPTII,S$GLB, | Performed by: INTERNAL MEDICINE

## 2023-04-12 NOTE — PROGRESS NOTES
Subjective:       Patient ID: Shaka Hatch is a 67 y.o. male.    Chief Complaint: No chief complaint on file.      HPI  Shaka Hatch is a 67 y.o. male with chronic conditions of  hyperlipidemia, hypothyroidism , vitamin-D deficiency, and found with a polyp at the appendiceal orifice who presents today for routine health maintenance.    Overall feels well.  Watches his diet decreasing rice, past, and tortillas.  Chooses fish, small amount of pork, and vegetables for his meals.  Has  oatmeal for breakfast with some honey and seeds.  Exercises regularly by walking 40 minutes at a moderate pace with no exertional symptoms.    Patient reports labs are abnormal since has not been taking his medication as prescribed.  Went sending ago to help his daughter move and forgot to take medications several times and left the medications behind when he returned.  Restarted the medications the day after labs were done.    Reports has been having bilateral short the pain.  This is a new problem.  Usually hurts with internal rotation but has not noted any weakness or numbness.  Possibly associated with helping his daughter move location.  His previous back pains are better.  Took glucosamine and was doing stretching exercises.    Has no toxic habits.    Health Maintenance:  Health Maintenance   Topic Date Due    TETANUS VACCINE  Never done    Lipid Panel  04/04/2028    Hepatitis C Screening  Completed       Review of Systems   Constitutional: Negative.  Negative for fever and unexpected weight change.   HENT: Negative.     Respiratory: Negative.     Cardiovascular: Negative.    Gastrointestinal: Negative.  Negative for abdominal pain, change in bowel habit and change in bowel habit.   Endocrine: Negative for cold intolerance and heat intolerance.   Genitourinary:  Negative for difficulty urinating.   Musculoskeletal:  Positive for arthralgias (Shoulder pains).   Integumentary:  Negative.   Neurological: Negative.     Psychiatric/Behavioral: Negative.      Past Medical History:   Diagnosis Date    Hyperlipidemia     Thyroid disease     Vitamin D deficiency        Past Surgical History:   Procedure Laterality Date    COLONOSCOPY N/A 3/3/2021    Procedure: COLONOSCOPY suprep;  Surgeon: Evan Byers MD;  Location: [facility];  Service: Endoscopy;  Laterality: N/A;       Family History   Problem Relation Age of Onset    Diabetes Mellitus Mother     Hypertension Mother     Kidney cancer Mother     No Known Problems Father     Thyroid disease Sister     Chronic back pain Brother     Heart disease Maternal Grandmother        Social History     Socioeconomic History    Marital status: Single   Tobacco Use    Smoking status: Never    Smokeless tobacco: Never   Substance and Sexual Activity    Alcohol use: Not Currently    Drug use: Never    Sexual activity: Yes     Partners: Female       Current Outpatient Medications   Medication Sig Dispense Refill    aspirin (ECOTRIN) 81 MG EC tablet Take 1 tablet (81 mg total) by mouth every Mon, Wed, Fri. 12 tablet 11    atorvastatin (LIPITOR) 10 MG tablet TAKE 1 TABLET(10 MG) BY MOUTH EVERY DAY 90 tablet 3    carboxymethylcellulose-glycern 0.5-0.9 % Drop Apply 2 drops to eye 3 (three) times daily. 15 mL 0    cholecalciferol, vitamin D3, (VITAMIN D3) 25 mcg (1,000 unit) capsule Take 1 capsule (1,000 Units total) by mouth once daily. 30 capsule 3    ketoconazole (NIZORAL) 2 % cream Apply topically once daily. 30 g 6    levothyroxine (SYNTHROID) 75 MCG tablet Take 1 tablet (75 mcg total) by mouth before breakfast. 90 tablet 2    peg 400-propylene glycol, PF, (SYSTANE ULTRA, PF,) 0.4-0.3 % Dpet Apply 2 drops to eye once daily. 10 each 6     No current facility-administered medications for this visit.     Facility-Administered Medications Ordered in Other Visits   Medication Dose Route Frequency Provider Last Rate Last Admin    0.9%  NaCl infusion   Intravenous Continuous Evan Byers MD 20  mL/hr at 03/03/21 1232 New Bag at 03/03/21 1232    sodium chloride 0.9% flush 10 mL  10 mL Intravenous PRN Evan Byers MD           Review of patient's allergies indicates:  No Known Allergies      Objective:       Last 3 sets of Vitals    Vitals - 1 value per visit 3/16/2022 5/19/2022 5/19/2022   SYSTOLIC 120 - 110   DIASTOLIC 82 - 68   Pulse 77 - 68   Temp - - -   Resp - - -   SPO2 98 - 98   Weight (lb) 169.75 - 168.43   Weight (kg) 77 - 76.4   Height 66 - 66   BMI (Calculated) 27.4 - 27.2   VISIT REPORT - - -   Pain Score  - 0 -   Physical Exam  Constitutional:       General: He is not in acute distress.  HENT:      Head: Normocephalic.      Right Ear: Tympanic membrane, ear canal and external ear normal.      Left Ear: Tympanic membrane, ear canal and external ear normal.      Nose: Nose normal.      Mouth/Throat:      Mouth: Mucous membranes are moist.   Eyes:      General: No scleral icterus.     Extraocular Movements: Extraocular movements intact.      Conjunctiva/sclera: Conjunctivae normal.   Neck:      Vascular: No carotid bruit.      Comments: No goiter.  Cardiovascular:      Rate and Rhythm: Normal rate and regular rhythm.      Pulses: Normal pulses.      Heart sounds: Normal heart sounds.   Pulmonary:      Effort: Pulmonary effort is normal.      Breath sounds: Normal breath sounds.   Abdominal:      General: Bowel sounds are normal. There is no distension.      Palpations: Abdomen is soft. There is no mass.      Tenderness: There is no abdominal tenderness.   Musculoskeletal:         General: No swelling.   Lymphadenopathy:      Cervical: No cervical adenopathy.   Skin:     General: Skin is warm and dry.   Neurological:      General: No focal deficit present.      Mental Status: He is alert and oriented to person, place, and time.   Psychiatric:         Mood and Affect: Mood normal.         Behavior: Behavior normal.         CBC:  Recent Labs   Lab 12/17/20  0845 09/20/21  0812 03/14/22  0842    WBC 5.79 6.05 5.53   RBC 4.81 4.86 4.97   Hemoglobin 15.1 14.7 15.5   Hematocrit 45.5 44.6 46.7   Platelets 291 259 265   MCV 95 92 94   MCH 31.4 H 30.2 31.2 H   MCHC 33.2 33.0 33.2     CMP:  Recent Labs   Lab 03/14/22  0842 05/17/22  0730 04/04/23  0816   Glucose 126 H 116 H 109   Calcium 9.7 9.2 9.7   Albumin 4.2 3.7 4.1   Total Protein 7.7 6.7 7.1   Sodium 139 143 141   Potassium 4.1 4.5 4.7   CO2 28 28 27   Chloride 103 106 105   BUN 12 9 11   Creatinine 0.8 0.8 0.8   Alkaline Phosphatase 65 60 68   ALT 36 29 40   AST 29 30 34   Total Bilirubin 0.5 0.7 0.5     URINALYSIS:       LIPIDS:  Recent Labs   Lab 03/14/22  0842 05/17/22  0730 04/04/23  0816   TSH 8.371 H 6.035 H 9.849 H   HDL 64 52 58   Cholesterol 324 H 191 293 H   Triglycerides 203 H 205 H 161 H   LDL Cholesterol 219.4 H 98.0 202.8 H   HDL/Cholesterol Ratio 19.8 L 27.2 19.8 L   Non-HDL Cholesterol 260 139 235   Total Cholesterol/HDL Ratio 5.1 H 3.7 5.1 H     TSH:  Recent Labs   Lab 03/14/22  0842 05/17/22  0730 04/04/23  0816   TSH 8.371 H 6.035 H 9.849 H       A1C:  Recent Labs   Lab 03/26/21  0822 09/20/21  0812 03/14/22  0842 04/04/23  0816   Hemoglobin A1C 5.7 H 5.7 H 5.7 H 5.8 H       Imaging:  CT Abdomen Pelvis With Contrast  Narrative: EXAMINATION:  CT ABDOMEN PELVIS WITH CONTRAST    CLINICAL HISTORY:  Colon polyp, cancerous; Other intra-abdominal and pelvic swelling, mass and lump    TECHNIQUE:  Low dose axial images, sagittal and coronal reformations were obtained from the lung bases to the pubic symphysis following the IV administration of 75 mL of Omnipaque 350 and the oral administration of 30 mL of Omnipaque 350.    COMPARISON:  None    FINDINGS:  Heart: Normal in size. No pericardial effusion.    Lung Bases: Bilateral dependent atelectasis.  Consolidation or pleural fluid.    Liver: Normal in size and overall attenuation.  There is a thin hyperdensity within the right subcapsular liver margin, likely relating to variable  perfusion.    Gallbladder: No calcified gallstones.    Bile Ducts: No evidence of dilated ducts.    Pancreas: No mass or peripancreatic fat stranding.    Spleen: Small accessory spleen.    Adrenals: Unremarkable.    Kidneys/ Ureters: Normal size and location.  No hydroureteronephrosis.  No mass.    Bladder: No evidence of wall thickening.    Reproductive organs: Mild prostatomegaly with dystrophic calcification.    GI Tract/Mesentery: No evidence of bowel obstruction or inflammation.    Peritoneal Space: No ascites. No free air.    Retroperitoneum: No significant adenopathy.    Abdominal wall: Unremarkable.    Vasculature: No significant atherosclerosis or aneurysm.    Bones: Degenerative changes of the spine.  Sclerotic focus of the left lateral T9 vertebral body, likely relating to bone island..  No acute fracture.  Impression: Thin hyperdensity within the right subcapsular liver margin, likely relating to variable perfusion or small hemangioma.    Mild prostatomegaly with dystrophic calcification.    Additional findings above.    Electronically signed by resident: Bárbara Salazar  Date:    04/08/2022  Time:    15:16    Electronically signed by: Stephen Santo MD  Date:    04/08/2022  Time:    15:50      Assessment:       1. Pure hypercholesterolemia    2. Hypothyroidism, unspecified type    3. Acute pain of both shoulders    4. Prediabetes    5. Serrated polyp of colon    6. Prostate cancer screening            Plan:       1. Pure hypercholesterolemia  -     Lipid Panel; Future; Expected date: 04/12/2023  - will re-evaluate a few weeks after starting medications.    2. Hypothyroidism, unspecified type  -     TSH; Future; Expected date: 04/12/2023  -     T4, Free; Future; Expected date: 04/12/2023  - was not taking the medication and will need to re-evaluate.  Clinically euthyroid.    3. Acute pain of both shoulders  -     Ambulatory referral/consult to Physical/Occupational Therapy; Future; Expected date:  04/19/2023  -     CBC Auto Differential; Future; Expected date: 04/12/2023  - wants to avoid medications.    4. Prediabetes   - Better fasting blood glucose.  A1c remains borderline.   - Continue lifestyle modifications.    5. Serrated polyp of colon   - Declined surgery.   - follow-up colonoscopy next year.   - so far no changes in bowel habits and no anemia.  No weight loss.    6. Prostate cancer screening  -     PSA, Screening; Future; Expected date: 04/12/2023       Health Maintenance Due   Topic Date Due    TETANUS VACCINE  Never done    Shingles Vaccine (1 of 2) Never done    Pneumococcal Vaccines (Age 65+) (1 - PCV) Never done    COVID-19 Vaccine (3 - Booster for Moderna series) 06/23/2021    Influenza Vaccine (1) Never done            Return to clinic in 6 months or earlier if needed.    Valencia Jurado MD  [facility]  Disclaimer:  This note has been generated using voice-recognition software. There may be grammatical or spelling errors that have been missed during proof-reading

## 2023-04-19 ENCOUNTER — CLINICAL SUPPORT (OUTPATIENT)
Dept: REHABILITATION | Facility: HOSPITAL | Age: 68
End: 2023-04-19
Attending: INTERNAL MEDICINE
Payer: MEDICARE

## 2023-04-19 DIAGNOSIS — M25.512 ACUTE PAIN OF BOTH SHOULDERS: ICD-10-CM

## 2023-04-19 DIAGNOSIS — M25.511 ACUTE PAIN OF BOTH SHOULDERS: ICD-10-CM

## 2023-04-19 PROCEDURE — 97165 OT EVAL LOW COMPLEX 30 MIN: CPT | Mod: HCNC,PO

## 2023-04-19 PROCEDURE — 97110 THERAPEUTIC EXERCISES: CPT | Mod: HCNC,PO

## 2023-04-19 PROCEDURE — 97010 HOT OR COLD PACKS THERAPY: CPT | Mod: HCNC,PO

## 2023-04-19 NOTE — PLAN OF CARE
[facility] Occupational Therapy  Initial Evaluation     Date: 4/19/2023  Patient: Shaka Hatch  Chart Number: [id number]  Referring Physician: Valencia Jurado MD  Therapy Diagnosis: No diagnosis found.    Medical Diagnosis: BI-lateral Shoulder Pain   Physician Orders: Evaluate & Treat   Evaluation Date: 4/19/2023  Plan of Care Certification Date: 6/19/23   Authorization Period: 4/12/23-4/11/24  Surgery Date and Procedure: Not Applicable   Date of Return to Medical Doctor: 10/12/23    Visit #: 1 of 1  Time In: 2:15 PM   Time Out: 3:00 PM   Total Billable Time: 45 minutes     Precautions: Standard,     Subjective     Involved Side: Bi-lateral   Dominant Side: Right   Date of Onset: 1 month ago   History of Current Condition: Reports has been having bilateral shoulder pain.  This is a new problem.  Usually hurts with internal rotation but has not noted any weakness or numbness.  Possibly associated with helping his daughter move location.  His previous back pains are better.  Took glucosamine and was doing stretching exercises. Patient reports pain is worse in Left compared to Right  Imaging: None Available   Previous Therapy: Not Applicable     Patient's Goals for Therapy: Return to Prior Level of Function     Pain:  Functional Pain Scale Rating 0-10:   5/10 on average  0/10 at best  7/10 at worst  Location: Bi-lateral shoulders   Description: sore  Aggravating Factors: internal rotation   Easing Factors: Not Applicable     Previous Level of function Independent     Current Level of Function Patient is limited in functional activity (ex: reaching behind back, carrying)    Occupation:    Working presently: Retired  Duties: Not Applicable     Past Medical History/Physical Systems Review:   Shaka Hatch  has a past medical history of Hyperlipidemia, Thyroid disease, and Vitamin D deficiency.    Shaka Hatch  has a past surgical history that includes Colonoscopy (N/A,  3/3/2021).    Shaka has a current medication list which includes the following prescription(s): aspirin, atorvastatin, carboxymethylcellulose-glycern, cholecalciferol (vitamin d3), ketoconazole, levothyroxine, and systane ultra (pf), and the following Facility-Administered Medications: sodium chloride 0.9% and sodium chloride 0.9%.    Review of patient's allergies indicates:  No Known Allergies       Objective     Mental Status: alert    Observation:   Within Normal Limits           Range of Motion:   Bi-lateral    Shoulder active range of motion RIGHT   SF  SE  SA  ER  IR    170 70 150 85    80    Shoulder active range of motion LEFT   SF  SE  SA  ER  IR    150 60 140 80     75       Centers for Medicare/Medicaid Services  Impairment/Limitation/Restriction for Focus on Therapeutic Outcomes Hand/Wrist/Finger Survey    Focus on Therapeutic Outcomes documents entered into [employer] - see Media section.    Limitation Score: 49%%  Category: Carrying           Treatment     Treatment Time In: 2:40 PM   Treatment Time Out: 3:00 PM   Total Treatment time separate from Evaluation time:20 minutes     Shaka received the following supervised modalities after being cleared for contraindications for 10 minutes in order to promote increased blood flow and tissue elasticity:   -Patient received MH x 10 min to bi-lateral shoulders to increase blood flow, circulation and tissue elasticity prior to therex       Shaka performed therapeutic exercises for 10 minutes including:  -Shoulder stretches      Home Exercise Program/Education:  Issued Home Exercise Program (see patient instructions in Electronic Medical Record) and educated on modality use for pain management . Exercises were reviewed and Shaka was able to demonstrate them prior to the end of the session.   Patient received a written copy of exercises to perform at home. Shaka demonstrated good  understanding of the education provided.  Patient was advised to perform these  exercises free of pain, and to stop performing them if pain occurs.    Patient/Family Education: role of occupational therapy, goals for occupational therapy, scheduling/cancellations - patient verbalized understanding. Discussed insurance limitations with patient.    Additional Education provided: Not Applicable       Assessment     Shaka Hatch is a 67 y.o. male presents with limitations as described in problem list. Patient can benefit from Occupational Therapy services for Iontophoresis, ultrasound, moist heat, therapeutic exercises, home exercise program provied with written instructions, ice and strengthening and orthotics, if deemed necessary . The following goals were discussed with the patient and she is in agreement with them as to be addressed in the treatment plan.    The patient's rehab potential is Fair.     Anticipated barriers to occupational therapy: Unknown Etiology   Patient has no cultural, educational or language barriers to learning provided.    Profile and History Assessment of Occupational Performance Level of Clinical Decision Making Complexity Score   Occupational Profile:   Shaka Hatch is a 67 y.o. male who is retired Shaka Hatch has difficulty with  Activities of daily living and instrumental activities of daily living as listed previously, which  affecting his/her daily functional abilities.      Comorbidities:    has a past medical history of Hyperlipidemia, Thyroid disease, and Vitamin D deficiency.    Medical and Therapy History Review:   Brief               Performance Deficits    Physical:  Joint Mobility  Pain    Cognitive:  No Deficits    Psychosocial:    No Deficits     Clinical Decision Making:  low    Modification/Need for Assistance:  Not Necessary    Intervention Selection:  Several Treatment Options       low  Based on Prior Medical History, co morbidities , data from assessments and functional level of assistance required with task and clinical  presentation directly impacting function.         Goals:    Long Term Goals (6 weeks):  1)   Decrease complaints of pain to  0 out of 10 at worst to increase functional hand use for activities of daily living/work/leisure activities.  2)   Patient to score at 31% or less on Focus on Therapeutic Outcomes to demonstrate improved perception of functional arm use.  3)   Pt will return to near to prior level of function for activities of daily living and household management reporting Independent or Mod Independent with Activities of Daily Living (dressing, feeding, grooming, toileting).               Plan     Patient to be treated by Occupational Therapy 1 times per week for 6 weeks during the certification period from 4/19/2023 to 6/19/23 to achieve the established goals.     Treatment to include: Paraffin, Fluidotherapy, Manual therapy/joint mobilizations, Modalities for pain management, US 3 mhz, Therapeutic exercises/activities., Strengthening, and Joint Protection, as well as any other treatments deemed necessary based on the patient's needs or progress.     Chantelle Weston OTR/L, CHT  Occupational therapist, Certified Hand Therapist

## 2023-04-19 NOTE — PATIENT INSTRUCTIONS
[facility]  OCCUPATIONAL THERAPY  HOME EXERCISE PROGRAM                     Stand holding towel  behind body, one arm above head, other arm bent behind back. With lower hand, pull gently downward. Hold _3__ seconds.  Repeat __5_ times per session. Do _3__ sessions per day.                  Stretch shoulder inward: 1. Pull arm with other hand until elbow comes to midline. 2. Extend arms in front at shoulder height, crossed at elbow, then clasp hands. Hold __3__ seconds. Repeat, switching arms.  Repeat ___5_ times. Do __3__ sessions per day.          Stand holding wand behind back. Raise arms as far as possible.  Repeat 5 times per set.  Do 3 sessions per day.      Scheduling information:      -We work by set appointment , 45 minute sessions. Please be on time for your session as we can only treat you within the time frame of your session.    - If you have any concerns with your schedule, please contact the  ([phone]). I do not have any capability to make appointment adjustment.    - Children  are NOT allowed in treatment areas, unless the child is the patient. Please find childcare prior to attending your therapy session.           Copyright © I. All rights reserved.   Therapist: BEVERLY Uriostegui, SHEFALI

## 2023-04-24 NOTE — PROGRESS NOTES
Occupational Therapy Daily Treatment Note     Date: 4/26/2023  Name: Shaka Hatch  Clinic Number: [id number]    Therapy Diagnosis:   Encounter Diagnosis   Name Primary?    Bilateral shoulder pain, unspecified chronicity      Physician: Valencia Jurado MD    Medical Diagnosis: BI-lateral Shoulder Pain   Physician Orders: Evaluate & Treat   Evaluation Date: 4/19/2023  Plan of Care Certification Date: 6/19/23   Authorization Period: 4/12/23-4/11/24  Surgery Date and Procedure: Not Applicable   Date of Return to Medical Doctor: 10/12/23    Visit # / Visits authorized: 1 / 20  Time In:2:15 PM   Time Out: 3:00 PM   Total Billable Time: 45 minutes    Precautions:  Standard      Subjective     Patient reports: that he is feeling better and that he can reach higher on his back when internally rotating. Patient reports that sometimes one side is more painful than the other and others its vice versa.   Response to previous treatment:tolerated well     Pain: 4/10  Location: Bi-lateral Shoulders    Objective     Shaka received the following supervised modalities after being cleared for contraindications for 10 minutes in order to promote increased blood flow and tissue elasticity:   -Patient received MH x 10 min to bi-lateral shoulders to increase blood flow, circulation and tissue elasticity prior to therex         Shaka performed therapeutic exercises for 35 minutes including:  -Internal Rotation stretching   -Scapular Mobilization   -Scapular Retraction & Shoulder Extension with yellow theraband x 30   -Shoulder rolls forward & backward x 20        Home Exercises and Education Provided     Education provided:   - Progress towards goals     Written Home Exercises Provided: Patient instructed to continue prior HEP.  Exercises were reviewed and Shaka was able to demonstrate them prior to the end of the session.  Shaka demonstrated good  understanding of the Home Exercise Program provided.   .   See Electronic Medical  Record under Patient Instructions for exercises provided prior visit.        Assessment     Patient would continue to benefit from skilled Occupational Therapist      Shaka is progressing well towards his goals and there are no updates to goals at this time. Patient prognosis is Good.     Patient will continue to benefit from skilled outpatient occupational therapy to address the deficits listed in the problem list on initial evaluation provide patient/family education and to maximize patient's level of independence in the home and community environment.     Anticipated barriers to therapy: Not Applicable       Patient's spiritual, cultural and educational needs considered and patient agreeable to plan of care and goals.    Goals:    Long Term Goals (6 weeks):  1)   Decrease complaints of pain to  0 out of 10 at worst to increase functional hand use for activities of daily living/work/leisure activities.  2)   Patient to score at 31% or less on Focus on Therapeutic Outcomes to demonstrate improved perception of functional arm use.  3)   Pt will return to near to prior level of function for activities of daily living and household management reporting Independent or Mod Independent with Activities of Daily Living (dressing, feeding, grooming, toileting).     Plan   Continue Occupational Therapy to address above goals.        BEVERLY Uriostegui, JUSTINT   Occupational Therapist, Certified Hand Therapist

## 2023-04-26 ENCOUNTER — CLINICAL SUPPORT (OUTPATIENT)
Dept: REHABILITATION | Facility: HOSPITAL | Age: 68
End: 2023-04-26
Payer: MEDICARE

## 2023-04-26 DIAGNOSIS — M25.512 BILATERAL SHOULDER PAIN, UNSPECIFIED CHRONICITY: ICD-10-CM

## 2023-04-26 DIAGNOSIS — M25.511 BILATERAL SHOULDER PAIN, UNSPECIFIED CHRONICITY: ICD-10-CM

## 2023-04-26 PROBLEM — M25.519 SHOULDER PAIN: Status: ACTIVE | Noted: 2023-04-26

## 2023-04-26 PROCEDURE — 97010 HOT OR COLD PACKS THERAPY: CPT | Mod: HCNC,PO

## 2023-04-26 PROCEDURE — 97530 THERAPEUTIC ACTIVITIES: CPT | Mod: HCNC,PO

## 2023-04-27 ENCOUNTER — PATIENT MESSAGE (OUTPATIENT)
Dept: FAMILY MEDICINE | Facility: CLINIC | Age: 68
End: 2023-04-27
Payer: MEDICARE

## 2023-04-27 DIAGNOSIS — M25.511 ACUTE PAIN OF BOTH SHOULDERS: Primary | ICD-10-CM

## 2023-04-27 DIAGNOSIS — M25.512 ACUTE PAIN OF BOTH SHOULDERS: Primary | ICD-10-CM

## 2023-04-28 ENCOUNTER — PATIENT MESSAGE (OUTPATIENT)
Dept: FAMILY MEDICINE | Facility: CLINIC | Age: 68
End: 2023-04-28
Payer: MEDICARE

## 2023-05-02 ENCOUNTER — PATIENT MESSAGE (OUTPATIENT)
Dept: FAMILY MEDICINE | Facility: CLINIC | Age: 68
End: 2023-05-02
Payer: MEDICARE

## 2023-05-02 DIAGNOSIS — M25.512 ACUTE PAIN OF BOTH SHOULDERS: Primary | ICD-10-CM

## 2023-05-02 DIAGNOSIS — M25.511 ACUTE PAIN OF BOTH SHOULDERS: Primary | ICD-10-CM

## 2023-05-09 ENCOUNTER — PATIENT MESSAGE (OUTPATIENT)
Dept: FAMILY MEDICINE | Facility: CLINIC | Age: 68
End: 2023-05-09
Payer: MEDICARE

## 2023-05-10 ENCOUNTER — PATIENT MESSAGE (OUTPATIENT)
Dept: FAMILY MEDICINE | Facility: CLINIC | Age: 68
End: 2023-05-10
Payer: MEDICARE

## 2023-05-10 ENCOUNTER — TELEPHONE (OUTPATIENT)
Dept: FAMILY MEDICINE | Facility: CLINIC | Age: 68
End: 2023-05-10
Payer: MEDICARE

## 2023-05-10 NOTE — TELEPHONE ENCOUNTER
Informed by [facility] clinic  that patient did not want to follow up with them due to his insurance not being accepted

## 2023-05-10 NOTE — TELEPHONE ENCOUNTER
----- Message from Jenna Marin sent at 5/10/2023 10:14 AM CDT -----  Type:  referral    Who Called: Tu lopez/ damon dhaliwal   Would the patient rather a call back or a response via [facility]? call  Best Call Back Number:   Additional Information:    Caller stated they received referrals and pt not ready to schedule   Their clinic  do not accept medicaid

## 2023-06-01 ENCOUNTER — PATIENT MESSAGE (OUTPATIENT)
Dept: FAMILY MEDICINE | Facility: CLINIC | Age: 68
End: 2023-06-01
Payer: MEDICAID

## 2023-06-07 ENCOUNTER — LAB VISIT (OUTPATIENT)
Dept: LAB | Facility: HOSPITAL | Age: 68
End: 2023-06-07
Attending: INTERNAL MEDICINE
Payer: MEDICARE

## 2023-06-07 DIAGNOSIS — M25.512 ACUTE PAIN OF BOTH SHOULDERS: ICD-10-CM

## 2023-06-07 DIAGNOSIS — Z12.5 PROSTATE CANCER SCREENING: ICD-10-CM

## 2023-06-07 DIAGNOSIS — E03.9 HYPOTHYROIDISM, UNSPECIFIED TYPE: ICD-10-CM

## 2023-06-07 DIAGNOSIS — M25.511 ACUTE PAIN OF BOTH SHOULDERS: ICD-10-CM

## 2023-06-07 DIAGNOSIS — E78.00 PURE HYPERCHOLESTEROLEMIA: ICD-10-CM

## 2023-06-07 LAB
BASOPHILS # BLD AUTO: 0.06 K/UL (ref 0–0.2)
BASOPHILS NFR BLD: 0.9 % (ref 0–1.9)
CHOLEST SERPL-MCNC: 210 MG/DL (ref 120–199)
CHOLEST/HDLC SERPL: 3.4 {RATIO} (ref 2–5)
COMPLEXED PSA SERPL-MCNC: 3.6 NG/ML (ref 0–4)
DIFFERENTIAL METHOD: ABNORMAL
EOSINOPHIL # BLD AUTO: 0.1 K/UL (ref 0–0.5)
EOSINOPHIL NFR BLD: 2 % (ref 0–8)
ERYTHROCYTE [DISTWIDTH] IN BLOOD BY AUTOMATED COUNT: 12.8 % (ref 11.5–14.5)
HCT VFR BLD AUTO: 44.5 % (ref 40–54)
HDLC SERPL-MCNC: 61 MG/DL (ref 40–75)
HDLC SERPL: 29 % (ref 20–50)
HGB BLD-MCNC: 15.1 G/DL (ref 14–18)
IMM GRANULOCYTES # BLD AUTO: 0.02 K/UL (ref 0–0.04)
IMM GRANULOCYTES NFR BLD AUTO: 0.3 % (ref 0–0.5)
LDLC SERPL CALC-MCNC: 124.8 MG/DL (ref 63–159)
LYMPHOCYTES # BLD AUTO: 2.5 K/UL (ref 1–4.8)
LYMPHOCYTES NFR BLD: 38.5 % (ref 18–48)
MCH RBC QN AUTO: 31.3 PG (ref 27–31)
MCHC RBC AUTO-ENTMCNC: 33.9 G/DL (ref 32–36)
MCV RBC AUTO: 92 FL (ref 82–98)
MONOCYTES # BLD AUTO: 0.7 K/UL (ref 0.3–1)
MONOCYTES NFR BLD: 11.1 % (ref 4–15)
NEUTROPHILS # BLD AUTO: 3 K/UL (ref 1.8–7.7)
NEUTROPHILS NFR BLD: 47.2 % (ref 38–73)
NONHDLC SERPL-MCNC: 149 MG/DL
NRBC BLD-RTO: 0 /100 WBC
PLATELET # BLD AUTO: 244 K/UL (ref 150–450)
PMV BLD AUTO: 9.6 FL (ref 9.2–12.9)
RBC # BLD AUTO: 4.82 M/UL (ref 4.6–6.2)
T4 FREE SERPL-MCNC: 0.96 NG/DL (ref 0.71–1.51)
TRIGL SERPL-MCNC: 121 MG/DL (ref 30–150)
TSH SERPL DL<=0.005 MIU/L-ACNC: 5.9 UIU/ML (ref 0.4–4)
WBC # BLD AUTO: 6.41 K/UL (ref 3.9–12.7)

## 2023-06-07 PROCEDURE — 84153 ASSAY OF PSA TOTAL: CPT | Performed by: INTERNAL MEDICINE

## 2023-06-07 PROCEDURE — 36415 COLL VENOUS BLD VENIPUNCTURE: CPT | Performed by: INTERNAL MEDICINE

## 2023-06-07 PROCEDURE — 80061 LIPID PANEL: CPT | Performed by: INTERNAL MEDICINE

## 2023-06-07 PROCEDURE — 85025 COMPLETE CBC W/AUTO DIFF WBC: CPT | Performed by: INTERNAL MEDICINE

## 2023-06-07 PROCEDURE — 84439 ASSAY OF FREE THYROXINE: CPT | Performed by: INTERNAL MEDICINE

## 2023-06-07 PROCEDURE — 84443 ASSAY THYROID STIM HORMONE: CPT | Performed by: INTERNAL MEDICINE

## 2023-06-09 ENCOUNTER — PATIENT MESSAGE (OUTPATIENT)
Dept: FAMILY MEDICINE | Facility: CLINIC | Age: 68
End: 2023-06-09
Payer: MEDICAID

## 2023-06-22 ENCOUNTER — PATIENT MESSAGE (OUTPATIENT)
Dept: FAMILY MEDICINE | Facility: CLINIC | Age: 68
End: 2023-06-22
Payer: MEDICAID

## 2023-06-22 DIAGNOSIS — M25.512 ACUTE PAIN OF BOTH SHOULDERS: Primary | ICD-10-CM

## 2023-06-22 DIAGNOSIS — M25.511 ACUTE PAIN OF BOTH SHOULDERS: Primary | ICD-10-CM

## 2023-06-22 DIAGNOSIS — M25.512 BILATERAL SHOULDER PAIN, UNSPECIFIED CHRONICITY: ICD-10-CM

## 2023-06-22 DIAGNOSIS — M25.511 BILATERAL SHOULDER PAIN, UNSPECIFIED CHRONICITY: ICD-10-CM

## 2023-06-23 ENCOUNTER — PATIENT MESSAGE (OUTPATIENT)
Dept: FAMILY MEDICINE | Facility: CLINIC | Age: 68
End: 2023-06-23
Payer: MEDICAID

## 2023-06-23 DIAGNOSIS — E78.00 PURE HYPERCHOLESTEROLEMIA: ICD-10-CM

## 2023-06-23 RX ORDER — LEVOTHYROXINE SODIUM 75 UG/1
75 TABLET ORAL
Qty: 90 TABLET | Refills: 2 | Status: SHIPPED | OUTPATIENT
Start: 2023-06-23 | End: 2024-03-12 | Stop reason: SDUPTHER

## 2023-06-23 RX ORDER — ATORVASTATIN CALCIUM 10 MG/1
10 TABLET, FILM COATED ORAL DAILY
Qty: 90 TABLET | Refills: 3 | Status: SHIPPED | OUTPATIENT
Start: 2023-06-23 | End: 2024-03-12 | Stop reason: SDUPTHER

## 2023-06-23 NOTE — TELEPHONE ENCOUNTER
No care due was identified.  [facility] Embedded Care Due Messages. Reference number: [id number].   6/23/2023 10:50:33 AM CDT

## 2023-06-23 NOTE — TELEPHONE ENCOUNTER
No care due was identified.  [facility] Embedded Care Due Messages. Reference number: [id number].   6/23/2023 10:51:35 AM CDT

## 2023-08-15 ENCOUNTER — PES CALL (OUTPATIENT)
Dept: ADMINISTRATIVE | Facility: CLINIC | Age: 68
End: 2023-08-15
Payer: MEDICAID

## 2023-10-12 ENCOUNTER — OFFICE VISIT (OUTPATIENT)
Dept: FAMILY MEDICINE | Facility: CLINIC | Age: 68
End: 2023-10-12
Payer: MEDICARE

## 2023-10-12 VITALS
BODY MASS INDEX: 27.25 KG/M2 | DIASTOLIC BLOOD PRESSURE: 82 MMHG | HEIGHT: 66 IN | WEIGHT: 169.56 LBS | HEART RATE: 71 BPM | OXYGEN SATURATION: 97 % | SYSTOLIC BLOOD PRESSURE: 130 MMHG

## 2023-10-12 DIAGNOSIS — R73.03 PREDIABETES: ICD-10-CM

## 2023-10-12 DIAGNOSIS — Z79.899 MEDICATION MANAGEMENT: ICD-10-CM

## 2023-10-12 DIAGNOSIS — E03.9 HYPOTHYROIDISM, UNSPECIFIED TYPE: Primary | ICD-10-CM

## 2023-10-12 DIAGNOSIS — E78.5 HYPERLIPIDEMIA, UNSPECIFIED HYPERLIPIDEMIA TYPE: ICD-10-CM

## 2023-10-12 PROCEDURE — 3288F FALL RISK ASSESSMENT DOCD: CPT | Mod: HCNC,CPTII,S$GLB, | Performed by: INTERNAL MEDICINE

## 2023-10-12 PROCEDURE — 3044F HG A1C LEVEL LT 7.0%: CPT | Mod: HCNC,CPTII,S$GLB, | Performed by: INTERNAL MEDICINE

## 2023-10-12 PROCEDURE — 1126F PR PAIN SEVERITY QUANTIFIED, NO PAIN PRESENT: ICD-10-PCS | Mod: HCNC,CPTII,S$GLB, | Performed by: INTERNAL MEDICINE

## 2023-10-12 PROCEDURE — 3075F PR MOST RECENT SYSTOLIC BLOOD PRESS GE 130-139MM HG: ICD-10-PCS | Mod: HCNC,CPTII,S$GLB, | Performed by: INTERNAL MEDICINE

## 2023-10-12 PROCEDURE — 1160F RVW MEDS BY RX/DR IN RCRD: CPT | Mod: HCNC,CPTII,S$GLB, | Performed by: INTERNAL MEDICINE

## 2023-10-12 PROCEDURE — 1159F MED LIST DOCD IN RCRD: CPT | Mod: HCNC,CPTII,S$GLB, | Performed by: INTERNAL MEDICINE

## 2023-10-12 PROCEDURE — 1160F PR REVIEW ALL MEDS BY PRESCRIBER/CLIN PHARMACIST DOCUMENTED: ICD-10-PCS | Mod: HCNC,CPTII,S$GLB, | Performed by: INTERNAL MEDICINE

## 2023-10-12 PROCEDURE — 99214 OFFICE O/P EST MOD 30 MIN: CPT | Mod: HCNC,S$GLB,, | Performed by: INTERNAL MEDICINE

## 2023-10-12 PROCEDURE — 3008F PR BODY MASS INDEX (BMI) DOCUMENTED: ICD-10-PCS | Mod: HCNC,CPTII,S$GLB, | Performed by: INTERNAL MEDICINE

## 2023-10-12 PROCEDURE — 3079F PR MOST RECENT DIASTOLIC BLOOD PRESSURE 80-89 MM HG: ICD-10-PCS | Mod: HCNC,CPTII,S$GLB, | Performed by: INTERNAL MEDICINE

## 2023-10-12 PROCEDURE — 3044F PR MOST RECENT HEMOGLOBIN A1C LEVEL <7.0%: ICD-10-PCS | Mod: HCNC,CPTII,S$GLB, | Performed by: INTERNAL MEDICINE

## 2023-10-12 PROCEDURE — 99999 PR PBB SHADOW E&M-EST. PATIENT-LVL III: ICD-10-PCS | Mod: PBBFAC,HCNC,, | Performed by: INTERNAL MEDICINE

## 2023-10-12 PROCEDURE — 1101F PR PT FALLS ASSESS DOC 0-1 FALLS W/OUT INJ PAST YR: ICD-10-PCS | Mod: HCNC,CPTII,S$GLB, | Performed by: INTERNAL MEDICINE

## 2023-10-12 PROCEDURE — 3008F BODY MASS INDEX DOCD: CPT | Mod: HCNC,CPTII,S$GLB, | Performed by: INTERNAL MEDICINE

## 2023-10-12 PROCEDURE — 99214 PR OFFICE/OUTPT VISIT, EST, LEVL IV, 30-39 MIN: ICD-10-PCS | Mod: HCNC,S$GLB,, | Performed by: INTERNAL MEDICINE

## 2023-10-12 PROCEDURE — 1159F PR MEDICATION LIST DOCUMENTED IN MEDICAL RECORD: ICD-10-PCS | Mod: HCNC,CPTII,S$GLB, | Performed by: INTERNAL MEDICINE

## 2023-10-12 PROCEDURE — 1101F PT FALLS ASSESS-DOCD LE1/YR: CPT | Mod: HCNC,CPTII,S$GLB, | Performed by: INTERNAL MEDICINE

## 2023-10-12 PROCEDURE — 1126F AMNT PAIN NOTED NONE PRSNT: CPT | Mod: HCNC,CPTII,S$GLB, | Performed by: INTERNAL MEDICINE

## 2023-10-12 PROCEDURE — 3075F SYST BP GE 130 - 139MM HG: CPT | Mod: HCNC,CPTII,S$GLB, | Performed by: INTERNAL MEDICINE

## 2023-10-12 PROCEDURE — 3079F DIAST BP 80-89 MM HG: CPT | Mod: HCNC,CPTII,S$GLB, | Performed by: INTERNAL MEDICINE

## 2023-10-12 PROCEDURE — 3288F PR FALLS RISK ASSESSMENT DOCUMENTED: ICD-10-PCS | Mod: HCNC,CPTII,S$GLB, | Performed by: INTERNAL MEDICINE

## 2023-10-12 PROCEDURE — 99999 PR PBB SHADOW E&M-EST. PATIENT-LVL III: CPT | Mod: PBBFAC,HCNC,, | Performed by: INTERNAL MEDICINE

## 2023-10-12 NOTE — PROGRESS NOTES
Subjective:       Patient ID: Shaka Hatch is a 68 y.o. male.    Chief Complaint: Follow-up (6 month )      HPI  Shaka Hatch is a 68 y.o. male with hyperlipidemia, hypothyroidism , vitamin-D deficiency, and found with a polyp at the appendiceal orifice and has decided to watch it who presents today for Follow-up (6 month )    Patient reports has been feeling well.  His shoulder pain is better but still bothers him occasionally.  Is better when he does stretching exercises or applies heat.  Pain is not limiting.    Reports he is taking his thyroid medications and cholesterol medications most of the time.  No significant weight loss,.  Keeping his diet low carb and healthy.    Exercises daily with walks or runs and stretching exercises.  Stays active around the house.      No GI pain or bowel movement changes.  Next colonoscopy would be next year.  Decided to watch polyp instead of resection recommended by specialist.    Declines vaccines.    Health Maintenance:  Health Maintenance   Topic Date Due    TETANUS VACCINE  Never done    Shingles Vaccine (1 of 2) Never done    Colorectal Cancer Screening  03/03/2024    PROSTATE-SPECIFIC ANTIGEN  06/07/2024    Lipid Panel  06/07/2028    Hepatitis C Screening  Completed       Review of Systems   Constitutional: Negative.  Negative for fever and unexpected weight change.   HENT: Negative.     Respiratory: Negative.     Cardiovascular: Negative.    Gastrointestinal: Negative.    Genitourinary:  Negative for difficulty urinating.   Musculoskeletal:  Positive for arthralgias (shoulder, right).   Integumentary:  Negative.   Neurological: Negative.    Psychiatric/Behavioral: Negative.        Past Medical History:   Diagnosis Date    Hyperlipidemia     Thyroid disease     Vitamin D deficiency        Past Surgical History:   Procedure Laterality Date    COLONOSCOPY N/A 3/3/2021    Procedure: COLONOSCOPY suprep;  Surgeon: Evan Byers MD;  Location: [facility];   "Service: Endoscopy;  Laterality: N/A;       Family History   Problem Relation Age of Onset    Diabetes Mellitus Mother     Hypertension Mother     Kidney cancer Mother     No Known Problems Father     Thyroid disease Sister     Chronic back pain Brother     Heart disease Maternal Grandmother        Social History     Socioeconomic History    Marital status: Single   Tobacco Use    Smoking status: Never    Smokeless tobacco: Never   Substance and Sexual Activity    Alcohol use: Not Currently     Alcohol/week: 3.0 standard drinks of alcohol     Types: 3 Glasses of wine per week     Comment: Drinks wine 2-3 times a week    Drug use: Never    Sexual activity: Yes     Partners: Female       Current Outpatient Medications   Medication Sig Dispense Refill    atorvastatin (LIPITOR) 10 MG tablet Take 1 tablet (10 mg total) by mouth once daily. 90 tablet 3    carboxymethylcellulose-glycern 0.5-0.9 % Drop Apply 2 drops to eye 3 (three) times daily. 15 mL 0    ketoconazole (NIZORAL) 2 % cream Apply topically once daily. 30 g 6    levothyroxine (SYNTHROID) 75 MCG tablet Take 1 tablet (75 mcg total) by mouth before breakfast. 90 tablet 2     No current facility-administered medications for this visit.     Facility-Administered Medications Ordered in Other Visits   Medication Dose Route Frequency Provider Last Rate Last Admin    0.9%  NaCl infusion   Intravenous Continuous Evan Byers MD 20 mL/hr at 03/03/21 1232 New Bag at 03/03/21 1232    sodium chloride 0.9% flush 10 mL  10 mL Intravenous PRN Evan Byers MD           Review of patient's allergies indicates:  No Known Allergies      Objective:       Last 3 sets of Vitals        5/19/2022     1:35 PM 4/12/2023    10:02 AM 10/12/2023     8:38 AM   Vitals - 1 value per visit   SYSTOLIC 110 134 130   DIASTOLIC 68 78 82   Pulse 68 81 71   SPO2 98 % 99 % 97 %   Weight (lb) 168.43 173.06 169.53   Weight (kg) 76.4 78.5 76.9   Height 5' 6" (1.676 m) 5' 6" (1.676 m) 5' 6" " (1.676 m)   BMI (Calculated) 27.2 27.9 27.4   Pain Score Zero Zero Zero   Physical Exam  Constitutional:       General: He is not in acute distress.  HENT:      Head: Normocephalic.      Right Ear: Tympanic membrane, ear canal and external ear normal.      Left Ear: Tympanic membrane, ear canal and external ear normal.      Nose: Nose normal.      Mouth/Throat:      Mouth: Mucous membranes are moist.   Eyes:      General: No scleral icterus.     Extraocular Movements: Extraocular movements intact.      Conjunctiva/sclera: Conjunctivae normal.   Neck:      Vascular: No carotid bruit.      Comments: No goiter.  Cardiovascular:      Rate and Rhythm: Normal rate and regular rhythm.      Pulses: Normal pulses.      Heart sounds: Normal heart sounds.   Pulmonary:      Effort: Pulmonary effort is normal.      Breath sounds: Normal breath sounds.   Abdominal:      General: Bowel sounds are normal. There is no distension.      Palpations: Abdomen is soft. There is no mass.      Tenderness: There is no abdominal tenderness.   Musculoskeletal:         General: No swelling. Normal range of motion.   Lymphadenopathy:      Cervical: No cervical adenopathy.   Skin:     General: Skin is warm and dry.   Neurological:      General: No focal deficit present.      Mental Status: He is alert and oriented to person, place, and time.   Psychiatric:         Mood and Affect: Mood normal.         Behavior: Behavior normal.           CBC:  Recent Labs   Lab 09/20/21  0812 03/14/22  0842 06/07/23  0739   WBC 6.05 5.53 6.41   RBC 4.86 4.97 4.82   Hemoglobin 14.7 15.5 15.1   Hematocrit 44.6 46.7 44.5   Platelets 259 265 244   MCV 92 94 92   MCH 30.2 31.2 H 31.3 H   MCHC 33.0 33.2 33.9     CMP:  Recent Labs   Lab 03/14/22  0842 05/17/22  0730 04/04/23  0816   Glucose 126 H 116 H 109   Calcium 9.7 9.2 9.7   Albumin 4.2 3.7 4.1   Total Protein 7.7 6.7 7.1   Sodium 139 143 141   Potassium 4.1 4.5 4.7   CO2 28 28 27   Chloride 103 106 105   BUN 12 9  11   Creatinine 0.8 0.8 0.8   Alkaline Phosphatase 65 60 68   ALT 36 29 40   AST 29 30 34   Total Bilirubin 0.5 0.7 0.5     URINALYSIS:       LIPIDS:  Recent Labs   Lab 05/17/22  0730 04/04/23  0816 06/07/23  0739   TSH 6.035 H 9.849 H 5.896 H   HDL 52 58 61   Cholesterol 191 293 H 210 H   Triglycerides 205 H 161 H 121   LDL Cholesterol 98.0 202.8 H 124.8   HDL/Cholesterol Ratio 27.2 19.8 L 29.0   Non-HDL Cholesterol 139 235 149   Total Cholesterol/HDL Ratio 3.7 5.1 H 3.4     TSH:  Recent Labs   Lab 05/17/22  0730 04/04/23  0816 06/07/23  0739   TSH 6.035 H 9.849 H 5.896 H       A1C:  Recent Labs   Lab 03/26/21  0822 09/20/21  0812 03/14/22  0842 04/04/23  0816   Hemoglobin A1C 5.7 H 5.7 H 5.7 H 5.8 H       Imaging:  CT Abdomen Pelvis With Contrast  Narrative: EXAMINATION:  CT ABDOMEN PELVIS WITH CONTRAST    CLINICAL HISTORY:  Colon polyp, cancerous; Other intra-abdominal and pelvic swelling, mass and lump    TECHNIQUE:  Low dose axial images, sagittal and coronal reformations were obtained from the lung bases to the pubic symphysis following the IV administration of 75 mL of Omnipaque 350 and the oral administration of 30 mL of Omnipaque 350.    COMPARISON:  None    FINDINGS:  Heart: Normal in size. No pericardial effusion.    Lung Bases: Bilateral dependent atelectasis.  Consolidation or pleural fluid.    Liver: Normal in size and overall attenuation.  There is a thin hyperdensity within the right subcapsular liver margin, likely relating to variable perfusion.    Gallbladder: No calcified gallstones.    Bile Ducts: No evidence of dilated ducts.    Pancreas: No mass or peripancreatic fat stranding.    Spleen: Small accessory spleen.    Adrenals: Unremarkable.    Kidneys/ Ureters: Normal size and location.  No hydroureteronephrosis.  No mass.    Bladder: No evidence of wall thickening.    Reproductive organs: Mild prostatomegaly with dystrophic calcification.    GI Tract/Mesentery: No evidence of bowel obstruction  or inflammation.    Peritoneal Space: No ascites. No free air.    Retroperitoneum: No significant adenopathy.    Abdominal wall: Unremarkable.    Vasculature: No significant atherosclerosis or aneurysm.    Bones: Degenerative changes of the spine.  Sclerotic focus of the left lateral T9 vertebral body, likely relating to bone island..  No acute fracture.  Impression: Thin hyperdensity within the right subcapsular liver margin, likely relating to variable perfusion or small hemangioma.    Mild prostatomegaly with dystrophic calcification.    Additional findings above.    Electronically signed by resident: Bárbara Salazar  Date:    04/08/2022  Time:    15:16    Electronically signed by: Stephen Santo MD  Date:    04/08/2022  Time:    15:50      Assessment:       1. Hypothyroidism, unspecified type    2. Hyperlipidemia, unspecified hyperlipidemia type    3. Prediabetes    4. Medication management            Plan:       1. Hypothyroidism, unspecified type  -     TSH; Future; Expected date: 10/12/2023  -     T4, Free; Future; Expected date: 10/12/2023  - reports compliance with medication, clinically euthyroid.  Same treatment for now.    2. Hyperlipidemia, unspecified hyperlipidemia type  -     Lipid Panel; Future; Expected date: 10/12/2023  -     Comprehensive Metabolic Panel; Future; Expected date: 10/12/2023  - reports still takes the medication but may not be every day.    3. Prediabetes  -     Hemoglobin A1C; Future; Expected date: 10/12/2023  -     Comprehensive Metabolic Panel; Future; Expected date: 10/12/2023  - A1cs have been stable around 5.8%.  Watching his diet and exercising.    4. Medication management  -     Vitamin D; Future; Expected date: 10/12/2023  - not taking supplement but was low in the past.       Health Maintenance Due   Topic Date Due    TETANUS VACCINE  Never done    Shingles Vaccine (1 of 2) Never done    Pneumococcal Vaccines (Age 65+) (1 - PCV) Never done    Influenza Vaccine (1) Never  done    COVID-19 Vaccine (3 - 2023-24 season) 09/01/2023            Return to clinic in 6 months.    Valencia Jurado MD  [facility]  Disclaimer:  This note has been generated using voice-recognition software. There may be grammatical or spelling errors that have been missed during proof-reading

## 2023-11-02 ENCOUNTER — LAB VISIT (OUTPATIENT)
Dept: LAB | Facility: HOSPITAL | Age: 68
End: 2023-11-02
Attending: INTERNAL MEDICINE
Payer: MEDICARE

## 2023-11-02 DIAGNOSIS — E78.5 HYPERLIPIDEMIA, UNSPECIFIED HYPERLIPIDEMIA TYPE: ICD-10-CM

## 2023-11-02 DIAGNOSIS — R73.03 PREDIABETES: ICD-10-CM

## 2023-11-02 DIAGNOSIS — Z79.899 MEDICATION MANAGEMENT: ICD-10-CM

## 2023-11-02 DIAGNOSIS — E03.9 HYPOTHYROIDISM, UNSPECIFIED TYPE: ICD-10-CM

## 2023-11-02 LAB
25(OH)D3+25(OH)D2 SERPL-MCNC: 32 NG/ML (ref 30–96)
ALBUMIN SERPL BCP-MCNC: 4.1 G/DL (ref 3.5–5.2)
ALP SERPL-CCNC: 52 U/L (ref 55–135)
ALT SERPL W/O P-5'-P-CCNC: 21 U/L (ref 10–44)
ANION GAP SERPL CALC-SCNC: 10 MMOL/L (ref 8–16)
AST SERPL-CCNC: 22 U/L (ref 10–40)
BILIRUB SERPL-MCNC: 0.8 MG/DL (ref 0.1–1)
BUN SERPL-MCNC: 9 MG/DL (ref 8–23)
CALCIUM SERPL-MCNC: 9.5 MG/DL (ref 8.7–10.5)
CHLORIDE SERPL-SCNC: 103 MMOL/L (ref 95–110)
CHOLEST SERPL-MCNC: 192 MG/DL (ref 120–199)
CHOLEST/HDLC SERPL: 3.3 {RATIO} (ref 2–5)
CO2 SERPL-SCNC: 28 MMOL/L (ref 23–29)
CREAT SERPL-MCNC: 0.8 MG/DL (ref 0.5–1.4)
EST. GFR  (NO RACE VARIABLE): >60 ML/MIN/1.73 M^2
ESTIMATED AVG GLUCOSE: 120 MG/DL (ref 68–131)
GLUCOSE SERPL-MCNC: 111 MG/DL (ref 70–110)
HBA1C MFR BLD: 5.8 % (ref 4–5.6)
HDLC SERPL-MCNC: 58 MG/DL (ref 40–75)
HDLC SERPL: 30.2 % (ref 20–50)
LDLC SERPL CALC-MCNC: 110.2 MG/DL (ref 63–159)
NONHDLC SERPL-MCNC: 134 MG/DL
POTASSIUM SERPL-SCNC: 4.2 MMOL/L (ref 3.5–5.1)
PROT SERPL-MCNC: 7.1 G/DL (ref 6–8.4)
SODIUM SERPL-SCNC: 141 MMOL/L (ref 136–145)
T4 FREE SERPL-MCNC: 1.03 NG/DL (ref 0.71–1.51)
TRIGL SERPL-MCNC: 119 MG/DL (ref 30–150)
TSH SERPL DL<=0.005 MIU/L-ACNC: 4.44 UIU/ML (ref 0.4–4)

## 2023-11-02 PROCEDURE — 36415 COLL VENOUS BLD VENIPUNCTURE: CPT | Mod: HCNC | Performed by: INTERNAL MEDICINE

## 2023-11-02 PROCEDURE — 84443 ASSAY THYROID STIM HORMONE: CPT | Mod: HCNC | Performed by: INTERNAL MEDICINE

## 2023-11-02 PROCEDURE — 84439 ASSAY OF FREE THYROXINE: CPT | Mod: HCNC | Performed by: INTERNAL MEDICINE

## 2023-11-02 PROCEDURE — 83036 HEMOGLOBIN GLYCOSYLATED A1C: CPT | Mod: HCNC | Performed by: INTERNAL MEDICINE

## 2023-11-02 PROCEDURE — 80053 COMPREHEN METABOLIC PANEL: CPT | Mod: HCNC | Performed by: INTERNAL MEDICINE

## 2023-11-02 PROCEDURE — 80061 LIPID PANEL: CPT | Mod: HCNC | Performed by: INTERNAL MEDICINE

## 2023-11-02 PROCEDURE — 82306 VITAMIN D 25 HYDROXY: CPT | Mod: HCNC | Performed by: INTERNAL MEDICINE

## 2023-11-08 ENCOUNTER — PATIENT MESSAGE (OUTPATIENT)
Dept: FAMILY MEDICINE | Facility: CLINIC | Age: 68
End: 2023-11-08
Payer: MEDICARE

## 2024-01-24 ENCOUNTER — PATIENT MESSAGE (OUTPATIENT)
Dept: PRIMARY CARE CLINIC | Facility: CLINIC | Age: 69
End: 2024-01-24
Payer: MEDICARE

## 2024-03-12 ENCOUNTER — OFFICE VISIT (OUTPATIENT)
Dept: PRIMARY CARE CLINIC | Facility: CLINIC | Age: 69
End: 2024-03-12
Payer: MEDICARE

## 2024-03-12 VITALS
WEIGHT: 170.06 LBS | SYSTOLIC BLOOD PRESSURE: 128 MMHG | HEIGHT: 66 IN | BODY MASS INDEX: 27.33 KG/M2 | DIASTOLIC BLOOD PRESSURE: 66 MMHG | OXYGEN SATURATION: 97 % | HEART RATE: 68 BPM

## 2024-03-12 DIAGNOSIS — E78.2 MIXED HYPERLIPIDEMIA: ICD-10-CM

## 2024-03-12 DIAGNOSIS — Z12.11 COLON CANCER SCREENING: ICD-10-CM

## 2024-03-12 DIAGNOSIS — K63.5 SERRATED POLYP OF COLON: ICD-10-CM

## 2024-03-12 DIAGNOSIS — E03.9 HYPOTHYROIDISM, UNSPECIFIED TYPE: ICD-10-CM

## 2024-03-12 DIAGNOSIS — Z00.00 ANNUAL PHYSICAL EXAM: Primary | ICD-10-CM

## 2024-03-12 DIAGNOSIS — E03.4 HYPOTHYROIDISM DUE TO ACQUIRED ATROPHY OF THYROID: ICD-10-CM

## 2024-03-12 DIAGNOSIS — R73.03 PREDIABETES: ICD-10-CM

## 2024-03-12 DIAGNOSIS — Z00.00 HEALTHCARE MAINTENANCE: ICD-10-CM

## 2024-03-12 DIAGNOSIS — M19.09 PRIMARY OSTEOARTHRITIS, OTHER SPECIFIED SITE: ICD-10-CM

## 2024-03-12 PROCEDURE — 1101F PT FALLS ASSESS-DOCD LE1/YR: CPT | Mod: HCNC,CPTII,S$GLB, | Performed by: INTERNAL MEDICINE

## 2024-03-12 PROCEDURE — 3288F FALL RISK ASSESSMENT DOCD: CPT | Mod: HCNC,CPTII,S$GLB, | Performed by: INTERNAL MEDICINE

## 2024-03-12 PROCEDURE — 3074F SYST BP LT 130 MM HG: CPT | Mod: HCNC,CPTII,S$GLB, | Performed by: INTERNAL MEDICINE

## 2024-03-12 PROCEDURE — 3008F BODY MASS INDEX DOCD: CPT | Mod: HCNC,CPTII,S$GLB, | Performed by: INTERNAL MEDICINE

## 2024-03-12 PROCEDURE — 99999 PR PBB SHADOW E&M-EST. PATIENT-LVL III: CPT | Mod: PBBFAC,HCNC,, | Performed by: INTERNAL MEDICINE

## 2024-03-12 PROCEDURE — 1159F MED LIST DOCD IN RCRD: CPT | Mod: HCNC,CPTII,S$GLB, | Performed by: INTERNAL MEDICINE

## 2024-03-12 PROCEDURE — 1160F RVW MEDS BY RX/DR IN RCRD: CPT | Mod: HCNC,CPTII,S$GLB, | Performed by: INTERNAL MEDICINE

## 2024-03-12 PROCEDURE — 99215 OFFICE O/P EST HI 40 MIN: CPT | Mod: HCNC,S$GLB,, | Performed by: INTERNAL MEDICINE

## 2024-03-12 PROCEDURE — 3078F DIAST BP <80 MM HG: CPT | Mod: HCNC,CPTII,S$GLB, | Performed by: INTERNAL MEDICINE

## 2024-03-12 PROCEDURE — 1126F AMNT PAIN NOTED NONE PRSNT: CPT | Mod: HCNC,CPTII,S$GLB, | Performed by: INTERNAL MEDICINE

## 2024-03-12 RX ORDER — KETOCONAZOLE 20 MG/G
CREAM TOPICAL DAILY
Qty: 30 G | Refills: 6 | Status: SHIPPED | OUTPATIENT
Start: 2024-03-12

## 2024-03-12 RX ORDER — LEVOTHYROXINE SODIUM 75 UG/1
75 TABLET ORAL
Qty: 90 TABLET | Refills: 2 | Status: SHIPPED | OUTPATIENT
Start: 2024-03-12 | End: 2025-03-12

## 2024-03-12 RX ORDER — ATORVASTATIN CALCIUM 10 MG/1
10 TABLET, FILM COATED ORAL DAILY
Qty: 90 TABLET | Refills: 3 | Status: SHIPPED | OUTPATIENT
Start: 2024-03-12

## 2024-03-12 NOTE — PROGRESS NOTES
Subjective:       Patient ID: Shaka Hatch is a 68 y.o. male.    Chief Complaint: Hypothyroidism      HPI  Shaka Hatch is a 68 y.o. male with  hyperlipidemia, hypothyroidism , vitamin-D deficiency, and found with a polyp at the appendiceal orifice and has decided to observe who presents today for Hypothyroidism    Reports overall feels ok. Shoulder pain has improved. Finds therapeutic massage worked better to relieve it.     Continues to take levothyroxine as prescribed.  Takes it on an empty stomach.  Weight has been stable, no temperature intolerance, normal bowel movements, no palpitations or shortness a breath.  Last labs with mild increase TSH but normal free T4.    He is taking atorvastatin as prescribed.  Keeps his diet healthy and stays active.    Has no toxic habits.  Exercises every day with 40 minute walks and stretching exercising.  Denies exertional symptoms.    Colon cancer screen- 3/3/2021, repeat in 3 years- a polyp in appendiceal orifice was noted.  PSA- 6/7/23, 3.6.  Vaccines- COVID booster, Flu, Pneumonia, RSV, and Shingles- declined  ACP- no documents in chart.    Health Maintenance:  Health Maintenance   Topic Date Due    TETANUS VACCINE  Never done    Shingles Vaccine (1 of 2) Never done    Colorectal Cancer Screening  03/03/2024    PROSTATE-SPECIFIC ANTIGEN  06/07/2024    High Dose Statin  03/12/2025    Lipid Panel  11/02/2028    Hepatitis C Screening  Completed       Review of Systems   Constitutional: Negative.  Negative for fever and unexpected weight change.   HENT:  Positive for ear pain (occasional clogged sensation).    Eyes:  Negative for visual disturbance (recent exam, found with small cataract.).   Respiratory: Negative.     Cardiovascular: Negative.    Gastrointestinal: Negative.    Genitourinary:  Negative for difficulty urinating.        Dribbling     Musculoskeletal: Negative.    Integumentary:  Negative.   Neurological: Negative.    Psychiatric/Behavioral:  Negative.  Negative for decreased concentration and sleep disturbance. The patient is not nervous/anxious.       Past Medical History:   Diagnosis Date    Hyperlipidemia     Serrated polyp of colon 06/16/2021    Thyroid disease     Vitamin D deficiency        Past Surgical History:   Procedure Laterality Date    COLONOSCOPY N/A 3/3/2021    Procedure: COLONOSCOPY suprep;  Surgeon: Evan Byers MD;  Location: [facility];  Service: Endoscopy;  Laterality: N/A;       Family History   Problem Relation Age of Onset    Diabetes Mellitus Mother     Hypertension Mother     Kidney cancer Mother     No Known Problems Father     Thyroid disease Sister     Chronic back pain Brother     Heart disease Maternal Grandmother        Social History     Socioeconomic History    Marital status: Single   Tobacco Use    Smoking status: Never    Smokeless tobacco: Never   Substance and Sexual Activity    Alcohol use: Not Currently     Alcohol/week: 3.0 standard drinks of alcohol     Types: 3 Glasses of wine per week     Comment: Drinks wine 2-3 times a week    Drug use: Never    Sexual activity: Yes     Partners: Female       Current Outpatient Medications   Medication Sig Dispense Refill    carboxymethylcellulose-glycern 0.5-0.9 % Drop Apply 2 drops to eye 3 (three) times daily. 15 mL 0    atorvastatin (LIPITOR) 10 MG tablet Take 1 tablet (10 mg total) by mouth once daily. 90 tablet 3    ketoconazole (NIZORAL) 2 % cream Apply topically once daily. 30 g 6    levothyroxine (SYNTHROID) 75 MCG tablet Take 1 tablet (75 mcg total) by mouth before breakfast. 90 tablet 2     No current facility-administered medications for this visit.     Facility-Administered Medications Ordered in Other Visits   Medication Dose Route Frequency Provider Last Rate Last Admin    0.9%  NaCl infusion   Intravenous Continuous Evan Byers MD 20 mL/hr at 03/03/21 1232 New Bag at 03/03/21 1232    sodium chloride 0.9% flush 10 mL  10 mL Intravenous PRN Zeeshan  "Evan ROBERTS MD           Review of patient's allergies indicates:  No Known Allergies      Objective:       Last 3 sets of Vitals        4/12/2023    10:02 AM 10/12/2023     8:38 AM 3/12/2024     8:19 AM   Vitals - 1 value per visit   SYSTOLIC 134 130 128   DIASTOLIC 78 82 66   Pulse 81 71 68   SPO2 99 % 97 % 97 %   Weight (lb) 173.06 169.53 170.09   Weight (kg) 78.5 76.9 77.15   Height 5' 6" (1.676 m) 5' 6" (1.676 m) 5' 6" (1.676 m)   BMI (Calculated) 27.9 27.4 27.5   Pain Score Zero Zero Zero   Physical Exam  Constitutional:       General: He is not in acute distress.  HENT:      Head: Normocephalic.      Right Ear: Tympanic membrane, ear canal and external ear normal.      Left Ear: Tympanic membrane, ear canal and external ear normal.      Nose: Nose normal.      Mouth/Throat:      Mouth: Mucous membranes are moist.   Eyes:      General: No scleral icterus.     Extraocular Movements: Extraocular movements intact.      Conjunctiva/sclera: Conjunctivae normal.      Pupils: Pupils are equal, round, and reactive to light.   Neck:      Vascular: No carotid bruit.      Comments: No goiter.  Cardiovascular:      Rate and Rhythm: Normal rate and regular rhythm.      Pulses: Normal pulses.      Heart sounds: Normal heart sounds.   Pulmonary:      Effort: Pulmonary effort is normal.      Breath sounds: Normal breath sounds.   Abdominal:      General: Bowel sounds are normal. There is no distension.      Palpations: Abdomen is soft. There is no mass.      Tenderness: There is no abdominal tenderness.   Musculoskeletal:         General: No swelling.   Lymphadenopathy:      Cervical: No cervical adenopathy.   Skin:     General: Skin is warm and dry.   Neurological:      General: No focal deficit present.      Mental Status: He is alert and oriented to person, place, and time.   Psychiatric:         Mood and Affect: Mood normal.         Behavior: Behavior normal.           CBC:  Recent Labs   Lab 09/20/21  0812 03/14/22  0842 " 06/07/23  0739   WBC 6.05 5.53 6.41   RBC 4.86 4.97 4.82   Hemoglobin 14.7 15.5 15.1   Hematocrit 44.6 46.7 44.5   Platelets 259 265 244   MCV 92 94 92   MCH 30.2 31.2 H 31.3 H   MCHC 33.0 33.2 33.9     CMP:  Recent Labs   Lab 05/17/22  0730 04/04/23  0816 11/02/23  0806   Glucose 116 H 109 111 H   Calcium 9.2 9.7 9.5   Albumin 3.7 4.1 4.1   Total Protein 6.7 7.1 7.1   Sodium 143 141 141   Potassium 4.5 4.7 4.2   CO2 28 27 28   Chloride 106 105 103   BUN 9 11 9   Creatinine 0.8 0.8 0.8   Alkaline Phosphatase 60 68 52 L   ALT 29 40 21   AST 30 34 22   Total Bilirubin 0.7 0.5 0.8     URINALYSIS:       LIPIDS:  Recent Labs   Lab 04/04/23  0816 06/07/23  0739 11/02/23  0806   TSH 9.849 H 5.896 H 4.441 H   HDL 58 61 58   Cholesterol 293 H 210 H 192   Triglycerides 161 H 121 119   LDL Cholesterol 202.8 H 124.8 110.2   HDL/Cholesterol Ratio 19.8 L 29.0 30.2   Non-HDL Cholesterol 235 149 134   Total Cholesterol/HDL Ratio 5.1 H 3.4 3.3     TSH:  Recent Labs   Lab 04/04/23  0816 06/07/23  0739 11/02/23  0806   TSH 9.849 H 5.896 H 4.441 H       A1C:  Recent Labs   Lab 03/26/21  0822 09/20/21  0812 03/14/22  0842 04/04/23  0816 11/02/23  0806   Hemoglobin A1C 5.7 H 5.7 H 5.7 H 5.8 H 5.8 H       Imaging:  CT Abdomen Pelvis With Contrast  Narrative: EXAMINATION:  CT ABDOMEN PELVIS WITH CONTRAST    CLINICAL HISTORY:  Colon polyp, cancerous; Other intra-abdominal and pelvic swelling, mass and lump    TECHNIQUE:  Low dose axial images, sagittal and coronal reformations were obtained from the lung bases to the pubic symphysis following the IV administration of 75 mL of Omnipaque 350 and the oral administration of 30 mL of Omnipaque 350.    COMPARISON:  None    FINDINGS:  Heart: Normal in size. No pericardial effusion.    Lung Bases: Bilateral dependent atelectasis.  Consolidation or pleural fluid.    Liver: Normal in size and overall attenuation.  There is a thin hyperdensity within the right subcapsular liver margin, likely relating  to variable perfusion.    Gallbladder: No calcified gallstones.    Bile Ducts: No evidence of dilated ducts.    Pancreas: No mass or peripancreatic fat stranding.    Spleen: Small accessory spleen.    Adrenals: Unremarkable.    Kidneys/ Ureters: Normal size and location.  No hydroureteronephrosis.  No mass.    Bladder: No evidence of wall thickening.    Reproductive organs: Mild prostatomegaly with dystrophic calcification.    GI Tract/Mesentery: No evidence of bowel obstruction or inflammation.    Peritoneal Space: No ascites. No free air.    Retroperitoneum: No significant adenopathy.    Abdominal wall: Unremarkable.    Vasculature: No significant atherosclerosis or aneurysm.    Bones: Degenerative changes of the spine.  Sclerotic focus of the left lateral T9 vertebral body, likely relating to bone island..  No acute fracture.  Impression: Thin hyperdensity within the right subcapsular liver margin, likely relating to variable perfusion or small hemangioma.    Mild prostatomegaly with dystrophic calcification.    Additional findings above.    Electronically signed by resident: Bárbara Salazar  Date:    04/08/2022  Time:    15:16    Electronically signed by: Stephen Santo MD  Date:    04/08/2022  Time:    15:50      Assessment:       1. Annual physical exam    2. Hypothyroidism, unspecified type    3. Mixed hyperlipidemia    4. Prediabetes    5. Primary osteoarthritis, other specified site    6. Serrated polyp of colon    7. Colon cancer screening    8. Healthcare maintenance    9. Hypothyroidism due to acquired atrophy of thyroid            Plan:       1. Annual physical exam  -     CBC Auto Differential; Future; Expected date: 03/12/2024  -     Comprehensive Metabolic Panel; Future; Expected date: 03/12/2024  -     Hemoglobin A1C; Future; Expected date: 03/12/2024  -     TSH; Future; Expected date: 03/12/2024  -     T4, Free; Future; Expected date: 03/12/2024  -     Lipid Panel; Future; Expected date:  03/12/2024    2. Hypothyroidism, unspecified type  Overview:  On levothyroxine 75 mcg daily    Assessment & Plan:  Clinically euthyroid.    Last TSH was borderline elevated at 4.4 with normal free T4.    Continue to monitor.    Orders:  -     levothyroxine (SYNTHROID) 75 MCG tablet; Take 1 tablet (75 mcg total) by mouth before breakfast.  Dispense: 90 tablet; Refill: 2  -     TSH; Future; Expected date: 03/12/2024  -     T4, Free; Future; Expected date: 03/12/2024    3. Mixed hyperlipidemia  Overview:  On atorvastatin 10 mg daily.    Assessment & Plan:  Last LDL was 110 mg/dl.  Tolerating treatment.  Last 10 year CVD risk was 14%.  Follow-up lipid profile.  Continue exercise and healthy diet..    Orders:  -     atorvastatin (LIPITOR) 10 MG tablet; Take 1 tablet (10 mg total) by mouth once daily.  Dispense: 90 tablet; Refill: 3  -     Lipid Panel; Future; Expected date: 03/12/2024    4. Prediabetes  Assessment & Plan:  Has been having mild elevation of the fasting glucose and A1c in the past.  Continue lifestyle modifications with healthy diet, exercise, and weight control.    Orders:  -     Comprehensive Metabolic Panel; Future; Expected date: 03/12/2024  -     Hemoglobin A1C; Future; Expected date: 03/12/2024    5. Primary osteoarthritis, other specified site  Assessment & Plan:  Conservative management with stretching exercises, massage, and Tylenol as needed.    Orders:  -     CBC Auto Differential; Future; Expected date: 03/12/2024    6. Serrated polyp of colon  Overview:  Colonoscopy on 4/28/21   - Two 5 to 8 mm polyps in the ascending colon, removed with a cold snare. Resected and retrieved.   - The examined portion of the ileum was normal.   - One 15 mm polyp at the appendiceal orifice. Resection not attempted as part of the polyp extending to appendix. Biopsied.     Pathology:  1.  APPENDICEAL ORIFICE, BIOPSY:   - Sessile serrated polyp   - No evidence of dysplasia or malignancy (multiple deeper levels  examined)   - See comments   2.  COLON, ASCENDING, BIOPSY:   - Tubular adenomas, fragmented     *Pathology did not show malignancy. It has been recommended by all physicians to consider resection but patient feels is not necessary yet as there was no malignancy. He would consider a less invasive procedure that he found on case studies on the Internet which are done endoscopically but not done in this institution. Preferred to wait.      7. Colon cancer screening  -     Ambulatory referral/consult to Endo Procedure ; Future; Expected date: 03/13/2024  -     CBC Auto Differential; Future; Expected date: 03/12/2024    8. Healthcare maintenance  Assessment & Plan:  - Yearly labs- ordered today  - Colon cancer screen- 3/3/2021, repeat in 3 years- a polyp in appendiceal orifice was noted.  - PSA- 6/7/23, 3.6.  - Vaccines- COVID booster, Flu, Pneumonia, RSV, and Shingles- declined  - ACP- no documents in chart. Will review forms for next visit. HPOA would be his son Dontrell Hatch.      9. Hypothyroidism due to acquired atrophy of thyroid  Overview:  On levothyroxine 75 mcg daily    Assessment & Plan:  Clinically euthyroid.    Last TSH was borderline elevated at 4.4 with normal free T4.    Continue to monitor.      Other orders  -     ketoconazole (NIZORAL) 2 % cream; Apply topically once daily.  Dispense: 30 g; Refill: 6       Health Maintenance Due   Topic Date Due    TETANUS VACCINE  Never done    Shingles Vaccine (1 of 2) Never done    RSV Vaccine (Age 60+ and Pregnant patients) (1 - 1-dose 60+ series) Never done    Pneumococcal Vaccines (Age 65+) (1 of 1 - PCV) Never done    COVID-19 Vaccine (3 - 2023-24 season) 09/01/2023    Colorectal Cancer Screening  03/03/2024        I spent a total of 45 minutes on the day of the visit.This includes face to face time and non-face to face time preparing to see the patient (eg, review of tests), obtaining and/or reviewing separately obtained history, documenting clinical  information in the electronic or other health record, independently interpreting results and communicating results to the patient/family/caregiver, or care coordinator.       Return to clinic in 3-6 months.    Valencia Jurado MD  [facility]  Disclaimer:  This note has been generated using voice-recognition software. There may be grammatical or spelling errors that have been missed during proof-reading

## 2024-03-12 NOTE — ASSESSMENT & PLAN NOTE
Last LDL was 110 mg/dl.  Tolerating treatment.  Last 10 year CVD risk was 14%.  Follow-up lipid profile.  Continue exercise and healthy diet..

## 2024-03-12 NOTE — ASSESSMENT & PLAN NOTE
Has been having mild elevation of the fasting glucose and A1c in the past.  Continue lifestyle modifications with healthy diet, exercise, and weight control.

## 2024-03-12 NOTE — ASSESSMENT & PLAN NOTE
- Yearly labs- ordered today  - Colon cancer screen- 3/3/2021, repeat in 3 years- a polyp in appendiceal orifice was noted.  - PSA- 6/7/23, 3.6.  - Vaccines- COVID booster, Flu, Pneumonia, RSV, and Shingles- declined  - ACP- no documents in chart. Will review forms for next visit. HPOA would be his son Dontrell Hatch.

## 2024-03-12 NOTE — ASSESSMENT & PLAN NOTE
Clinically euthyroid.    Last TSH was borderline elevated at 4.4 with normal free T4.    Continue to monitor.

## 2024-06-17 PROBLEM — Z00.00 HEALTHCARE MAINTENANCE: Status: RESOLVED | Noted: 2024-03-12 | Resolved: 2024-06-17

## 2024-09-06 ENCOUNTER — LAB VISIT (OUTPATIENT)
Dept: LAB | Facility: HOSPITAL | Age: 69
End: 2024-09-06
Attending: INTERNAL MEDICINE
Payer: MEDICARE

## 2024-09-06 DIAGNOSIS — Z00.00 ANNUAL PHYSICAL EXAM: ICD-10-CM

## 2024-09-06 DIAGNOSIS — Z12.11 COLON CANCER SCREENING: ICD-10-CM

## 2024-09-06 DIAGNOSIS — M19.09 PRIMARY OSTEOARTHRITIS, OTHER SPECIFIED SITE: ICD-10-CM

## 2024-09-06 DIAGNOSIS — E03.9 HYPOTHYROIDISM, UNSPECIFIED TYPE: ICD-10-CM

## 2024-09-06 DIAGNOSIS — E78.2 MIXED HYPERLIPIDEMIA: ICD-10-CM

## 2024-09-06 DIAGNOSIS — R73.03 PREDIABETES: ICD-10-CM

## 2024-09-06 LAB
ALBUMIN SERPL BCP-MCNC: 4 G/DL (ref 3.5–5.2)
ALP SERPL-CCNC: 65 U/L (ref 55–135)
ALT SERPL W/O P-5'-P-CCNC: 48 U/L (ref 10–44)
ANION GAP SERPL CALC-SCNC: 10 MMOL/L (ref 8–16)
AST SERPL-CCNC: 38 U/L (ref 10–40)
BASOPHILS # BLD AUTO: 0.06 K/UL (ref 0–0.2)
BASOPHILS NFR BLD: 0.9 % (ref 0–1.9)
BILIRUB SERPL-MCNC: 0.7 MG/DL (ref 0.1–1)
BUN SERPL-MCNC: 9 MG/DL (ref 8–23)
CALCIUM SERPL-MCNC: 9.5 MG/DL (ref 8.7–10.5)
CHLORIDE SERPL-SCNC: 104 MMOL/L (ref 95–110)
CHOLEST SERPL-MCNC: 227 MG/DL (ref 120–199)
CHOLEST/HDLC SERPL: 3.3 {RATIO} (ref 2–5)
CO2 SERPL-SCNC: 26 MMOL/L (ref 23–29)
CREAT SERPL-MCNC: 0.8 MG/DL (ref 0.5–1.4)
DIFFERENTIAL METHOD BLD: ABNORMAL
EOSINOPHIL # BLD AUTO: 0.1 K/UL (ref 0–0.5)
EOSINOPHIL NFR BLD: 2.1 % (ref 0–8)
ERYTHROCYTE [DISTWIDTH] IN BLOOD BY AUTOMATED COUNT: 12.9 % (ref 11.5–14.5)
EST. GFR  (NO RACE VARIABLE): >60 ML/MIN/1.73 M^2
ESTIMATED AVG GLUCOSE: 117 MG/DL (ref 68–131)
GLUCOSE SERPL-MCNC: 115 MG/DL (ref 70–110)
HBA1C MFR BLD: 5.7 % (ref 4–5.6)
HCT VFR BLD AUTO: 44.5 % (ref 40–54)
HDLC SERPL-MCNC: 69 MG/DL (ref 40–75)
HDLC SERPL: 30.4 % (ref 20–50)
HGB BLD-MCNC: 14.8 G/DL (ref 14–18)
IMM GRANULOCYTES # BLD AUTO: 0.01 K/UL (ref 0–0.04)
IMM GRANULOCYTES NFR BLD AUTO: 0.2 % (ref 0–0.5)
LDLC SERPL CALC-MCNC: 127.8 MG/DL (ref 63–159)
LYMPHOCYTES # BLD AUTO: 2.5 K/UL (ref 1–4.8)
LYMPHOCYTES NFR BLD: 37.7 % (ref 18–48)
MCH RBC QN AUTO: 31.7 PG (ref 27–31)
MCHC RBC AUTO-ENTMCNC: 33.3 G/DL (ref 32–36)
MCV RBC AUTO: 95 FL (ref 82–98)
MONOCYTES # BLD AUTO: 0.9 K/UL (ref 0.3–1)
MONOCYTES NFR BLD: 12.9 % (ref 4–15)
NEUTROPHILS # BLD AUTO: 3.1 K/UL (ref 1.8–7.7)
NEUTROPHILS NFR BLD: 46.2 % (ref 38–73)
NONHDLC SERPL-MCNC: 158 MG/DL
NRBC BLD-RTO: 0 /100 WBC
PLATELET # BLD AUTO: 248 K/UL (ref 150–450)
PMV BLD AUTO: 9.4 FL (ref 9.2–12.9)
POTASSIUM SERPL-SCNC: 4.2 MMOL/L (ref 3.5–5.1)
PROT SERPL-MCNC: 7 G/DL (ref 6–8.4)
RBC # BLD AUTO: 4.67 M/UL (ref 4.6–6.2)
SODIUM SERPL-SCNC: 140 MMOL/L (ref 136–145)
T4 FREE SERPL-MCNC: 1.11 NG/DL (ref 0.71–1.51)
TRIGL SERPL-MCNC: 151 MG/DL (ref 30–150)
TSH SERPL DL<=0.005 MIU/L-ACNC: 5.96 UIU/ML (ref 0.4–4)
WBC # BLD AUTO: 6.61 K/UL (ref 3.9–12.7)

## 2024-09-06 PROCEDURE — 80061 LIPID PANEL: CPT | Mod: HCNC | Performed by: INTERNAL MEDICINE

## 2024-09-06 PROCEDURE — 80053 COMPREHEN METABOLIC PANEL: CPT | Mod: HCNC | Performed by: INTERNAL MEDICINE

## 2024-09-06 PROCEDURE — 84439 ASSAY OF FREE THYROXINE: CPT | Mod: HCNC | Performed by: INTERNAL MEDICINE

## 2024-09-06 PROCEDURE — 83036 HEMOGLOBIN GLYCOSYLATED A1C: CPT | Mod: HCNC | Performed by: INTERNAL MEDICINE

## 2024-09-06 PROCEDURE — 85025 COMPLETE CBC W/AUTO DIFF WBC: CPT | Mod: HCNC | Performed by: INTERNAL MEDICINE

## 2024-09-06 PROCEDURE — 84443 ASSAY THYROID STIM HORMONE: CPT | Mod: HCNC | Performed by: INTERNAL MEDICINE

## 2024-09-10 ENCOUNTER — OFFICE VISIT (OUTPATIENT)
Dept: PRIMARY CARE CLINIC | Facility: CLINIC | Age: 69
End: 2024-09-10
Payer: MEDICARE

## 2024-09-10 VITALS
SYSTOLIC BLOOD PRESSURE: 136 MMHG | DIASTOLIC BLOOD PRESSURE: 82 MMHG | HEART RATE: 86 BPM | OXYGEN SATURATION: 97 % | HEIGHT: 66 IN | WEIGHT: 173.63 LBS | BODY MASS INDEX: 27.91 KG/M2

## 2024-09-10 DIAGNOSIS — E78.2 MIXED HYPERLIPIDEMIA: ICD-10-CM

## 2024-09-10 DIAGNOSIS — R73.03 PREDIABETES: ICD-10-CM

## 2024-09-10 DIAGNOSIS — Z00.00 HEALTHCARE MAINTENANCE: ICD-10-CM

## 2024-09-10 DIAGNOSIS — K63.5 SERRATED POLYP OF COLON: ICD-10-CM

## 2024-09-10 DIAGNOSIS — Z13.6 ENCOUNTER FOR SCREENING FOR CARDIOVASCULAR DISORDERS: ICD-10-CM

## 2024-09-10 DIAGNOSIS — R39.11 HESITANCY: ICD-10-CM

## 2024-09-10 DIAGNOSIS — Z12.11 COLON CANCER SCREENING: ICD-10-CM

## 2024-09-10 DIAGNOSIS — E03.4 HYPOTHYROIDISM DUE TO ACQUIRED ATROPHY OF THYROID: Primary | ICD-10-CM

## 2024-09-10 DIAGNOSIS — Z12.5 PROSTATE CANCER SCREENING: ICD-10-CM

## 2024-09-10 PROCEDURE — 3008F BODY MASS INDEX DOCD: CPT | Mod: HCNC,CPTII,S$GLB, | Performed by: INTERNAL MEDICINE

## 2024-09-10 PROCEDURE — 99999 PR PBB SHADOW E&M-EST. PATIENT-LVL IV: CPT | Mod: PBBFAC,HCNC,, | Performed by: INTERNAL MEDICINE

## 2024-09-10 PROCEDURE — 3079F DIAST BP 80-89 MM HG: CPT | Mod: HCNC,CPTII,S$GLB, | Performed by: INTERNAL MEDICINE

## 2024-09-10 PROCEDURE — 99215 OFFICE O/P EST HI 40 MIN: CPT | Mod: HCNC,S$GLB,, | Performed by: INTERNAL MEDICINE

## 2024-09-10 PROCEDURE — 3075F SYST BP GE 130 - 139MM HG: CPT | Mod: HCNC,CPTII,S$GLB, | Performed by: INTERNAL MEDICINE

## 2024-09-10 PROCEDURE — 3044F HG A1C LEVEL LT 7.0%: CPT | Mod: HCNC,CPTII,S$GLB, | Performed by: INTERNAL MEDICINE

## 2024-09-10 PROCEDURE — 1158F ADVNC CARE PLAN TLK DOCD: CPT | Mod: HCNC,CPTII,S$GLB, | Performed by: INTERNAL MEDICINE

## 2024-09-10 PROCEDURE — 1160F RVW MEDS BY RX/DR IN RCRD: CPT | Mod: HCNC,CPTII,S$GLB, | Performed by: INTERNAL MEDICINE

## 2024-09-10 PROCEDURE — 1126F AMNT PAIN NOTED NONE PRSNT: CPT | Mod: HCNC,CPTII,S$GLB, | Performed by: INTERNAL MEDICINE

## 2024-09-10 PROCEDURE — 1159F MED LIST DOCD IN RCRD: CPT | Mod: HCNC,CPTII,S$GLB, | Performed by: INTERNAL MEDICINE

## 2024-09-10 PROCEDURE — 1124F ACP DISCUSS-NO DSCNMKR DOCD: CPT | Mod: HCNC,CPTII,S$GLB, | Performed by: INTERNAL MEDICINE

## 2024-09-10 RX ORDER — ATORVASTATIN CALCIUM 20 MG/1
20 TABLET, FILM COATED ORAL DAILY
Qty: 90 TABLET | Refills: 3 | Status: SHIPPED | OUTPATIENT
Start: 2024-09-10 | End: 2024-09-13 | Stop reason: SDUPTHER

## 2024-09-10 RX ORDER — KETOCONAZOLE 20 MG/G
CREAM TOPICAL DAILY
Qty: 30 G | Refills: 6 | Status: SHIPPED | OUTPATIENT
Start: 2024-09-10

## 2024-09-10 RX ORDER — TAMSULOSIN HYDROCHLORIDE 0.4 MG/1
0.4 CAPSULE ORAL DAILY
Qty: 30 CAPSULE | Refills: 11 | Status: SHIPPED | OUTPATIENT
Start: 2024-09-10 | End: 2025-09-10

## 2024-09-10 NOTE — PROGRESS NOTES
Subjective:       Patient ID: Shaka Hatch is a 69 y.o. male.    Chief Complaint: Results      HPI  Shaka Hatch is a 69 y.o. male with hyperlipidemia, hypothyroidism , vitamin-D deficiency, and found with a polyp at the appendiceal orifice and has decided to observe who presents today for Results    Reports he overall feels well.     Notices brittle nails that associates to not using gloves. Started taking biotin and applying nail strengthening.    Labs with mild increased TSH but normal free T4.  Weight has been stable, denies temperature intolerance,  other symptoms.   Reports takes the medication and empty stomach.    Metabolic panel with on elevated glucose levels with A1c slightly better but still compatible with prediabetes. Kidney function within normal limits and mild increase of the ALT significant.    Lipid profile with increased cholesterol and LDL levels.  Reports compliance with atorvastatin tries to keep his diet healthy keeping it low carb and does not eat much meat.    Reports hesitancy,  decreased strength of stream, and gets up once at night.  Urine looks normal and denies dysuria. Last PSA was normal.  Has not seen Urology.    Mr. Hatch was found with a serrated polyp on previous colonoscopy and declined surgery. He wanted a less invasive or non surgical procedure to remove polyp but not possible. Reports normal bowel movements and no abdominal pain. Weight has been stable. A follow up colonoscopy recommended, which was ordered but had to cancel as [place] was going [place] be out of the [place].    Has no toxic habits.  Walks for exercise.    Advance Care Planning     Date: 09/10/2024    Power of   I initiated the process of voluntary advance care planning today and explained the importance of this process to the patient.  I introduced the concept of advance directives to the patient, as well. Then the patient received detailed information about the importance of designating a Health  Care Power of  (HCPOA). He was also instructed to communicate with this person about their wishes for future healthcare, should he become sick and lose decision-making capacity. The patient has not previously appointed a HCPOA. After our discussion, the patient has decided to complete a HCPOA and has appointed his son and daughter, health care agent:  Dontrell Hatch  & health care agent number:  [phone], [phone] I encouraged him to communicate with this person about their wishes for future healthcare, should he become sick and lose decision-making capacity.      A total of 5 min was spent on advance care planning, goals of care discussion, emotional support, formulating and communicating prognosis and exploring burden/benefit of various approaches of treatment. This discussion occurred on a fully voluntary basis with the verbal consent of the patient and/or family.    Advance Care Planning     Date: 09/10/2024  Patient was not able to provide an Advance Care Plan.             Health Maintenance:  Health Maintenance   Topic Date Due    TETANUS VACCINE  Never done    Shingles Vaccine (1 of 2) Never done    Colorectal Cancer Screening  03/03/2024    PROSTATE-SPECIFIC ANTIGEN  06/07/2024    Lipid Panel  09/06/2029    Hepatitis C Screening  Completed       Review of Systems   Constitutional:  Negative for activity change and unexpected weight change.   HENT:  Negative for hearing loss, rhinorrhea and trouble swallowing.    Eyes:  Negative for discharge and visual disturbance.   Respiratory:  Negative for chest tightness and wheezing.    Cardiovascular:  Negative for chest pain and palpitations.   Gastrointestinal:  Negative for blood in stool, constipation, diarrhea and vomiting.   Endocrine: Negative for polydipsia and polyuria.   Genitourinary:  Positive for difficulty urinating. Negative for hematuria and urgency.   Musculoskeletal:  Negative for arthralgias, joint swelling and neck  pain.   Neurological:  Negative for weakness and headaches.   Psychiatric/Behavioral:  Negative for confusion and dysphoric mood.       Past Medical History:   Diagnosis Date    Hyperlipidemia     Serrated polyp of colon 06/16/2021    Thyroid disease     Vitamin D deficiency        Past Surgical History:   Procedure Laterality Date    COLONOSCOPY N/A 3/3/2021    Procedure: COLONOSCOPY suprep;  Surgeon: Evan Byers MD;  Location: [facility];  Service: Endoscopy;  Laterality: N/A;       Family History   Problem Relation Name Age of Onset    Diabetes Mellitus Mother      Hypertension Mother      Kidney cancer Mother      No Known Problems Father      Thyroid disease Sister      Chronic back pain Brother      Heart disease Maternal Grandmother         Social History     Socioeconomic History    Marital status: Single   Tobacco Use    Smoking status: Never    Smokeless tobacco: Never   Substance and Sexual Activity    Alcohol use: Not Currently     Alcohol/week: 3.0 standard drinks of alcohol     Types: 3 Glasses of wine per week     Comment: Drinks wine 2-3 times a week    Drug use: Never    Sexual activity: Yes     Partners: Female     Social Determinants of Health     Financial Resource Strain: Medium Risk (9/3/2024)    Overall Financial Resource Strain (CARDIA)     Difficulty of Paying Living Expenses: Somewhat hard   Food Insecurity: No Food Insecurity (9/3/2024)    Hunger Vital Sign     Worried About Running Out of Food in the Last Year: Never true     Ran Out of Food in the Last Year: Never true   Physical Activity: Sufficiently Active (9/3/2024)    Exercise Vital Sign     Days of Exercise per Week: 5 days     Minutes of Exercise per Session: 40 min   Stress: No Stress Concern Present (9/3/2024)    [employer] - Occupational Stress Questionnaire     Feeling of Stress : Not at all   Housing Stability: Unknown (9/3/2024)    Housing Stability Vital Sign     Unable to Pay for Housing  "in the Last Year: No       Current Outpatient Medications   Medication Sig Dispense Refill    carboxymethylcellulose-glycern 0.5-0.9 % Drop Apply 2 drops to eye 3 (three) times daily. 15 mL 0    levothyroxine (SYNTHROID) 75 MCG tablet Take 1 tablet (75 mcg total) by mouth before breakfast. 90 tablet 2    atorvastatin (LIPITOR) 20 MG tablet Take 1 tablet (20 mg total) by mouth once daily. 90 tablet 3    ketoconazole (NIZORAL) 2 % cream Apply topically once daily. 30 g 6    tamsulosin (FLOMAX) 0.4 mg Cap Take 1 capsule (0.4 mg total) by mouth once daily. 30 capsule 11     No current facility-administered medications for this visit.     Facility-Administered Medications Ordered in Other Visits   Medication Dose Route Frequency Provider Last Rate Last Admin    0.9%  NaCl infusion   Intravenous Continuous Evan Byers MD 20 mL/hr at 03/03/21 1232 New Bag at 03/03/21 1232    sodium chloride 0.9% flush 10 mL  10 mL Intravenous PRN Evan Byers MD           Review of patient's allergies indicates:  No Known Allergies      Objective:       Last 3 sets of Vitals        10/12/2023     8:38 AM 3/12/2024     8:19 AM 9/10/2024    11:15 AM   Vitals - 1 value per visit   SYSTOLIC 130 128 136   DIASTOLIC 82 66 82   Pulse 71 68 86   SPO2 97 % 97 % 97 %   Weight (lb) 169.53 170.09 173.61   Weight (kg) 76.9 77.15 78.75   Height 5' 6" (1.676 m) 5' 6" (1.676 m) 5' 6" (1.676 m)   BMI (Calculated) 27.4 27.5 28   Pain Score Zero Zero Zero   Physical Exam  Constitutional:       General: He is not in acute distress.  HENT:      Head: Normocephalic.      Right Ear: Tympanic membrane, ear canal and external ear normal.      Left Ear: Tympanic membrane, ear canal and external ear normal.      Nose: Nose normal.      Mouth/Throat:      Mouth: Mucous membranes are moist.   Eyes:      General: No scleral icterus.     Extraocular Movements: Extraocular movements intact.      Conjunctiva/sclera: Conjunctivae normal.   Neck:      Vascular: " No carotid bruit.      Comments: No goiter.  Cardiovascular:      Rate and Rhythm: Normal rate and regular rhythm.      Pulses: Normal pulses.      Heart sounds: Normal heart sounds.   Pulmonary:      Effort: Pulmonary effort is normal.      Breath sounds: Normal breath sounds.   Abdominal:      General: Bowel sounds are normal. There is no distension.      Palpations: Abdomen is soft. There is no mass.      Tenderness: There is no abdominal tenderness.   Musculoskeletal:         General: No swelling.   Lymphadenopathy:      Cervical: No cervical adenopathy.   Skin:     General: Skin is warm and dry.   Neurological:      General: No focal deficit present.      Mental Status: He is alert and oriented to person, place, and time.   Psychiatric:         Mood and Affect: Mood normal.         Behavior: Behavior normal.           CBC:  Recent Labs   Lab 03/14/22  0842 06/07/23  0739 09/06/24  0817   WBC 5.53 6.41 6.61   RBC 4.97 4.82 4.67   Hemoglobin 15.5 15.1 14.8   Hematocrit 46.7 44.5 44.5   Platelets 265 244 248   MCV 94 92 95   MCH 31.2 H 31.3 H 31.7 H   MCHC 33.2 33.9 33.3     CMP:  Recent Labs   Lab 04/04/23  0816 11/02/23  0806 09/06/24  0817   Glucose 109 111 H 115 H   Calcium 9.7 9.5 9.5   Albumin 4.1 4.1 4.0   Total Protein 7.1 7.1 7.0   Sodium 141 141 140   Potassium 4.7 4.2 4.2   CO2 27 28 26   Chloride 105 103 104   BUN 11 9 9   Creatinine 0.8 0.8 0.8   Alkaline Phosphatase 68 52 L 65   ALT 40 21 48 H   AST 34 22 38   Total Bilirubin 0.5 0.8 0.7     URINALYSIS:       LIPIDS:  Recent Labs   Lab 06/07/23  0739 11/02/23  0806 09/06/24  0817   TSH 5.896 H 4.441 H 5.955 H   HDL 61 58 69   Cholesterol 210 H 192 227 H   Triglycerides 121 119 151 H   LDL Cholesterol 124.8 110.2 127.8   HDL/Cholesterol Ratio 29.0 30.2 30.4   Non-HDL Cholesterol 149 134 158   Total Cholesterol/HDL Ratio 3.4 3.3 3.3     TSH:  Recent Labs   Lab 06/07/23  0739 11/02/23  0806 09/06/24  0817   TSH 5.896 H 4.441 H 5.955 H       A1C:  Recent  Labs   Lab 09/20/21  0812 03/14/22  0842 04/04/23  0816 11/02/23  0806 09/06/24  0817   Hemoglobin A1C 5.7 H 5.7 H 5.8 H 5.8 H 5.7 H       Imaging:  CT Abdomen Pelvis With Contrast  Narrative: EXAMINATION:  CT ABDOMEN PELVIS WITH CONTRAST    CLINICAL HISTORY:  Colon polyp, cancerous; Other intra-abdominal and pelvic swelling, mass and lump    TECHNIQUE:  Low dose axial images, sagittal and coronal reformations were obtained from the lung bases to the pubic symphysis following the IV administration of 75 mL of Omnipaque 350 and the oral administration of 30 mL of Omnipaque 350.    COMPARISON:  None    FINDINGS:  Heart: Normal in size. No pericardial effusion.    Lung Bases: Bilateral dependent atelectasis.  Consolidation or pleural fluid.    Liver: Normal in size and overall attenuation.  There is a thin hyperdensity within the right subcapsular liver margin, likely relating to variable perfusion.    Gallbladder: No calcified gallstones.    Bile Ducts: No evidence of dilated ducts.    Pancreas: No mass or peripancreatic fat stranding.    Spleen: Small accessory spleen.    Adrenals: Unremarkable.    Kidneys/ Ureters: Normal size and location.  No hydroureteronephrosis.  No mass.    Bladder: No evidence of wall thickening.    Reproductive organs: Mild prostatomegaly with dystrophic calcification.    GI Tract/Mesentery: No evidence of bowel obstruction or inflammation.    Peritoneal Space: No ascites. No free air.    Retroperitoneum: No significant adenopathy.    Abdominal wall: Unremarkable.    Vasculature: No significant atherosclerosis or aneurysm.    Bones: Degenerative changes of the spine.  Sclerotic focus of the left lateral T9 vertebral body, likely relating to bone island..  No acute fracture.  Impression: Thin hyperdensity within the right subcapsular liver margin, likely relating to variable perfusion or small hemangioma.    Mild prostatomegaly with dystrophic calcification.    Additional findings  above.    Electronically signed by resident: Bárbara Salazar  Date:    04/08/2022  Time:    15:16    Electronically signed by: Stephen Santo MD  Date:    04/08/2022  Time:    15:50      Assessment:       1. Hypothyroidism due to acquired atrophy of thyroid    2. Mixed hyperlipidemia    3. Encounter for screening for cardiovascular disorders    4. Prediabetes    5. Colon cancer screening    6. Serrated polyp of colon    7. Hesitancy    8. Prostate cancer screening    9. Healthcare maintenance            Plan:       1. Hypothyroidism due to acquired atrophy of thyroid  Overview:  On levothyroxine 75 mcg daily    Assessment & Plan:  TSH mildly elevated but was on biotin.  Free T4 is normal and clinically euthyroid.  Will continue present treatment.    Orders:  -     TSH; Future; Expected date: 09/10/2024  -     T4, Free; Future; Expected date: 09/10/2024    2. Mixed hyperlipidemia  Overview:  Atorvastatin  will be increased to 20 mg daily.    Assessment & Plan:  Last LDL increased to 127 mg/dl.  Last 10 year CVD risk was 17.2%.  Tolerating treatment. Will increase atorvastatin.  Evaluation of plaque burden considered to evaluate for secondary prevention.  Continue exercise and healthy diet.    Orders:  -     atorvastatin (LIPITOR) 20 MG tablet; Take 1 tablet (20 mg total) by mouth once daily.  Dispense: 90 tablet; Refill: 3  -     Lipid Panel; Future; Expected date: 09/10/2024    3. Encounter for screening for cardiovascular disorders  -     CT Cardiac Scoring; Future; Expected date: 09/10/2024    4. Prediabetes  Assessment & Plan:  A1c continues compatible with prediabetes.  Continue to encourage healthy diet with low refined carbs,  good amount of protein, and exercise.    Orders:  -     Comprehensive Metabolic Panel; Future; Expected date: 09/10/2024  -     Hemoglobin A1C; Future; Expected date: 09/10/2024    5. Colon cancer screening  -     Ambulatory referral/consult to Endo Procedure ; Future; Expected  date: 09/11/2024    6. Serrated polyp of colon  Overview:  Colonoscopy on 4/28/21   - Two 5 to 8 mm polyps in the ascending colon, removed with a cold snare. Resected and retrieved.   - The examined portion of the ileum was normal.   - One 15 mm polyp at the appendiceal orifice. Resection not attempted as part of the polyp extending to appendix. Biopsied.     Pathology:  1.  APPENDICEAL ORIFICE, BIOPSY:   - Sessile serrated polyp   - No evidence of dysplasia or malignancy (multiple deeper levels examined)   - See comments   2.  COLON, ASCENDING, BIOPSY:   - Tubular adenomas, fragmented     *Pathology did not show malignancy. It has been recommended by all physicians to consider resection but patient feels is not necessary yet as there was no malignancy. He would consider a less invasive procedure that he found on case studies on the Internet which are done endoscopically but not done in this institution. Preferred to wait.    Assessment & Plan:  Pending repeat colonoscopy.  Will reordered so it can be scheduled.    Orders:  -     Ambulatory referral/consult to Endo Procedure ; Future; Expected date: 09/11/2024    7. Hesitancy  -     tamsulosin (FLOMAX) 0.4 mg Cap; Take 1 capsule (0.4 mg total) by mouth once daily.  Dispense: 30 capsule; Refill: 11  -     PSA, Screening; Future; Expected date: 09/10/2024  -     Urinalysis, Reflex to Urine Culture Urine, Clean Catch    8. Prostate cancer screening  -     PSA, Screening; Future; Expected date: 09/10/2024    9. Healthcare maintenance  Assessment & Plan:  - Yearly labs- ordered today  - Colon cancer screen- 3/3/2021, repeat in 3 years- a polyp in appendiceal orifice was noted.  - PSA- 6/7/23, 3.6.  - Vaccines- COVID booster, Flu, Pneumonia, RSV, and Shingles- declined  - ACP- HPOA  Was completed on 09/10/2024.  Will think about living will.      Other orders  -     ketoconazole (NIZORAL) 2 % cream; Apply topically once daily.  Dispense: 30 g; Refill: 6        Health Maintenance Due   Topic Date Due    TETANUS VACCINE  Never done    Shingles Vaccine (1 of 2) Never done    RSV Vaccine (Age 60+ and Pregnant patients) (1 - 1-dose 60+ series) Never done    Pneumococcal Vaccines (Age 65+) (1 of 1 - PCV) Never done    Colorectal Cancer Screening  03/03/2024    PROSTATE-SPECIFIC ANTIGEN  06/07/2024    Influenza Vaccine (1) 09/01/2024    COVID-19 Vaccine (3 - 2023-24 season) 09/01/2024        I spent a total of 40 minutes on the day of the visit.This includes face to face time and non-face to face time preparing to see the patient (eg, review of tests), obtaining and/or reviewing separately obtained history, documenting clinical information in the electronic or other health record, independently interpreting results and communicating results to the patient/family/caregiver, or care coordinator.       Return to clinic in 3 months.  WS 2    Valencia Jurado MD  [facility]  Disclaimer:  This note has been generated using voice-recognition software. There may be grammatical or spelling errors that have been missed during proof-reading

## 2024-09-11 NOTE — ASSESSMENT & PLAN NOTE
Last LDL increased to 127 mg/dl.  Last 10 year CVD risk was 17.2%.  Tolerating treatment. Will increase atorvastatin.  Evaluation of plaque burden considered to evaluate for secondary prevention.  Continue exercise and healthy diet.

## 2024-09-11 NOTE — ASSESSMENT & PLAN NOTE
A1c continues compatible with prediabetes.  Continue to encourage healthy diet with low refined carbs,  good amount of protein, and exercise.

## 2024-09-11 NOTE — ASSESSMENT & PLAN NOTE
- Yearly labs- ordered today  - Colon cancer screen- 3/3/2021, repeat in 3 years- a polyp in appendiceal orifice was noted.  - PSA- 6/7/23, 3.6.  - Vaccines- COVID booster, Flu, Pneumonia, RSV, and Shingles- declined  - ACP- HPOA  Was completed on 09/10/2024.  Will think about living will.

## 2024-09-11 NOTE — ASSESSMENT & PLAN NOTE
TSH mildly elevated but was on biotin.  Free T4 is normal and clinically euthyroid.  Will continue present treatment.

## 2024-09-13 DIAGNOSIS — E78.00 PURE HYPERCHOLESTEROLEMIA: ICD-10-CM

## 2024-09-13 DIAGNOSIS — E03.9 HYPOTHYROIDISM, UNSPECIFIED TYPE: ICD-10-CM

## 2024-09-13 RX ORDER — LEVOTHYROXINE SODIUM 75 UG/1
75 TABLET ORAL
Qty: 90 TABLET | Refills: 2 | Status: SHIPPED | OUTPATIENT
Start: 2024-09-13

## 2024-09-13 RX ORDER — ATORVASTATIN CALCIUM 20 MG/1
20 TABLET, FILM COATED ORAL NIGHTLY
Qty: 90 TABLET | Refills: 3 | Status: SHIPPED | OUTPATIENT
Start: 2024-09-13

## 2024-10-28 ENCOUNTER — PATIENT MESSAGE (OUTPATIENT)
Dept: PRIMARY CARE CLINIC | Facility: CLINIC | Age: 69
End: 2024-10-28
Payer: MEDICARE

## 2024-11-04 ENCOUNTER — TELEPHONE (OUTPATIENT)
Dept: PRIMARY CARE CLINIC | Facility: CLINIC | Age: 69
End: 2024-11-04
Payer: MEDICARE

## 2024-11-04 NOTE — TELEPHONE ENCOUNTER
----- Message from April sent at 11/4/2024  2:10 PM CST -----  Contact: [phone]  Patient is returning a phone call.    Who left a message for the patient: Layla Bauer MA     Does patient know what this is regarding:  Appt     Would you like a call back, or a response through your [facility] portal?:   Call Back     Comments:

## 2024-12-22 DIAGNOSIS — E78.00 PURE HYPERCHOLESTEROLEMIA: ICD-10-CM

## 2024-12-23 RX ORDER — ATORVASTATIN CALCIUM 20 MG/1
TABLET, FILM COATED ORAL
Qty: 90 TABLET | Refills: 3 | Status: SHIPPED | OUTPATIENT
Start: 2024-12-23

## 2025-01-03 ENCOUNTER — PATIENT MESSAGE (OUTPATIENT)
Dept: PRIMARY CARE CLINIC | Facility: CLINIC | Age: 70
End: 2025-01-03
Payer: MEDICARE

## 2025-01-07 ENCOUNTER — TELEPHONE (OUTPATIENT)
Dept: ENDOSCOPY | Facility: HOSPITAL | Age: 70
End: 2025-01-07
Payer: MEDICARE

## 2025-01-14 ENCOUNTER — TELEPHONE (OUTPATIENT)
Dept: ENDOSCOPY | Facility: HOSPITAL | Age: 70
End: 2025-01-14
Payer: MEDICARE

## 2025-01-15 ENCOUNTER — TELEPHONE (OUTPATIENT)
Dept: ENDOSCOPY | Facility: HOSPITAL | Age: 70
End: 2025-01-15
Payer: MEDICARE

## 2025-01-15 NOTE — TELEPHONE ENCOUNTER
Contacted Pt to schedule a pre admit testing appointment (PAT). Pt does not want to schedule, Pt refused.  Referral canceled/completed.  Pt will call back when he is ready to schedule

## 2025-01-30 DIAGNOSIS — Z00.00 ENCOUNTER FOR MEDICARE ANNUAL WELLNESS EXAM: ICD-10-CM

## 2025-02-07 ENCOUNTER — LAB VISIT (OUTPATIENT)
Dept: LAB | Facility: HOSPITAL | Age: 70
End: 2025-02-07
Attending: INTERNAL MEDICINE
Payer: MEDICARE

## 2025-02-07 DIAGNOSIS — R73.03 PREDIABETES: ICD-10-CM

## 2025-02-07 DIAGNOSIS — E03.4 HYPOTHYROIDISM DUE TO ACQUIRED ATROPHY OF THYROID: ICD-10-CM

## 2025-02-07 DIAGNOSIS — E78.2 MIXED HYPERLIPIDEMIA: ICD-10-CM

## 2025-02-07 DIAGNOSIS — Z12.5 PROSTATE CANCER SCREENING: ICD-10-CM

## 2025-02-07 DIAGNOSIS — R39.11 HESITANCY: ICD-10-CM

## 2025-02-07 LAB
ALBUMIN SERPL BCP-MCNC: 4 G/DL (ref 3.5–5.2)
ALP SERPL-CCNC: 66 U/L (ref 40–150)
ALT SERPL W/O P-5'-P-CCNC: 36 U/L (ref 10–44)
ANION GAP SERPL CALC-SCNC: 7 MMOL/L (ref 8–16)
AST SERPL-CCNC: 34 U/L (ref 10–40)
BILIRUB SERPL-MCNC: 0.8 MG/DL (ref 0.1–1)
BUN SERPL-MCNC: 8 MG/DL (ref 8–23)
CALCIUM SERPL-MCNC: 9.5 MG/DL (ref 8.7–10.5)
CHLORIDE SERPL-SCNC: 103 MMOL/L (ref 95–110)
CHOLEST SERPL-MCNC: 205 MG/DL (ref 120–199)
CHOLEST/HDLC SERPL: 3 {RATIO} (ref 2–5)
CO2 SERPL-SCNC: 29 MMOL/L (ref 23–29)
COMPLEXED PSA SERPL-MCNC: 4.3 NG/ML (ref 0–4)
CREAT SERPL-MCNC: 0.8 MG/DL (ref 0.5–1.4)
EST. GFR  (NO RACE VARIABLE): >60 ML/MIN/1.73 M^2
ESTIMATED AVG GLUCOSE: 126 MG/DL (ref 68–131)
GLUCOSE SERPL-MCNC: 115 MG/DL (ref 70–110)
HBA1C MFR BLD: 6 % (ref 4–5.6)
HDLC SERPL-MCNC: 69 MG/DL (ref 40–75)
HDLC SERPL: 33.7 % (ref 20–50)
LDLC SERPL CALC-MCNC: 100.4 MG/DL (ref 63–159)
NONHDLC SERPL-MCNC: 136 MG/DL
POTASSIUM SERPL-SCNC: 4.1 MMOL/L (ref 3.5–5.1)
PROT SERPL-MCNC: 7.3 G/DL (ref 6–8.4)
SODIUM SERPL-SCNC: 139 MMOL/L (ref 136–145)
T4 FREE SERPL-MCNC: 1.01 NG/DL (ref 0.71–1.51)
TRIGL SERPL-MCNC: 178 MG/DL (ref 30–150)
TSH SERPL DL<=0.005 MIU/L-ACNC: 7.42 UIU/ML (ref 0.4–4)

## 2025-02-07 PROCEDURE — 80061 LIPID PANEL: CPT | Mod: HCNC | Performed by: INTERNAL MEDICINE

## 2025-02-07 PROCEDURE — 84153 ASSAY OF PSA TOTAL: CPT | Mod: HCNC | Performed by: INTERNAL MEDICINE

## 2025-02-07 PROCEDURE — 80053 COMPREHEN METABOLIC PANEL: CPT | Mod: HCNC | Performed by: INTERNAL MEDICINE

## 2025-02-07 PROCEDURE — 84439 ASSAY OF FREE THYROXINE: CPT | Mod: HCNC | Performed by: INTERNAL MEDICINE

## 2025-02-07 PROCEDURE — 83036 HEMOGLOBIN GLYCOSYLATED A1C: CPT | Mod: HCNC | Performed by: INTERNAL MEDICINE

## 2025-02-07 PROCEDURE — 84443 ASSAY THYROID STIM HORMONE: CPT | Mod: HCNC | Performed by: INTERNAL MEDICINE

## 2025-02-12 ENCOUNTER — OFFICE VISIT (OUTPATIENT)
Dept: PRIMARY CARE CLINIC | Facility: CLINIC | Age: 70
End: 2025-02-12
Payer: MEDICARE

## 2025-02-12 VITALS
WEIGHT: 174.25 LBS | DIASTOLIC BLOOD PRESSURE: 82 MMHG | HEIGHT: 66 IN | HEART RATE: 68 BPM | SYSTOLIC BLOOD PRESSURE: 128 MMHG | OXYGEN SATURATION: 97 % | BODY MASS INDEX: 28 KG/M2

## 2025-02-12 DIAGNOSIS — E78.00 PURE HYPERCHOLESTEROLEMIA: Primary | ICD-10-CM

## 2025-02-12 PROCEDURE — 99999 PR PBB SHADOW E&M-EST. PATIENT-LVL III: CPT | Mod: PBBFAC,HCNC,, | Performed by: INTERNAL MEDICINE

## 2025-02-12 PROCEDURE — 99499 UNLISTED E&M SERVICE: CPT | Mod: HCNC,S$GLB,, | Performed by: INTERNAL MEDICINE

## 2025-02-19 ENCOUNTER — OFFICE VISIT (OUTPATIENT)
Dept: PRIMARY CARE CLINIC | Facility: CLINIC | Age: 70
End: 2025-02-19
Payer: MEDICARE

## 2025-02-19 VITALS
HEIGHT: 66 IN | BODY MASS INDEX: 27.97 KG/M2 | OXYGEN SATURATION: 97 % | SYSTOLIC BLOOD PRESSURE: 130 MMHG | DIASTOLIC BLOOD PRESSURE: 86 MMHG | HEART RATE: 81 BPM | WEIGHT: 174.06 LBS

## 2025-02-19 DIAGNOSIS — R73.03 PREDIABETES: ICD-10-CM

## 2025-02-19 DIAGNOSIS — E03.4 HYPOTHYROIDISM DUE TO ACQUIRED ATROPHY OF THYROID: Primary | ICD-10-CM

## 2025-02-19 DIAGNOSIS — K63.5 SERRATED POLYP OF COLON: ICD-10-CM

## 2025-02-19 DIAGNOSIS — Z00.00 HEALTHCARE MAINTENANCE: ICD-10-CM

## 2025-02-19 DIAGNOSIS — R03.0 DIASTOLIC BLOOD PRESSURE 80 TO 89 MM HG: ICD-10-CM

## 2025-02-19 DIAGNOSIS — E78.2 MIXED HYPERLIPIDEMIA: ICD-10-CM

## 2025-02-19 DIAGNOSIS — Z12.11 COLON CANCER SCREENING: ICD-10-CM

## 2025-02-19 PROBLEM — E03.9 HYPOTHYROIDISM: Status: RESOLVED | Noted: 2025-02-19 | Resolved: 2025-02-19

## 2025-02-19 PROBLEM — E03.9 HYPOTHYROIDISM: Status: ACTIVE | Noted: 2025-02-19

## 2025-02-19 RX ORDER — ATORVASTATIN CALCIUM 20 MG/1
20 TABLET, FILM COATED ORAL NIGHTLY
Qty: 90 TABLET | Refills: 1 | Status: SHIPPED | OUTPATIENT
Start: 2025-02-19

## 2025-02-19 RX ORDER — LEVOTHYROXINE SODIUM 75 UG/1
75 TABLET ORAL
Qty: 90 TABLET | Refills: 1 | Status: SHIPPED | OUTPATIENT
Start: 2025-02-19

## 2025-02-19 RX ORDER — KETOCONAZOLE 20 MG/G
CREAM TOPICAL DAILY
Qty: 30 G | Refills: 6 | Status: SHIPPED | OUTPATIENT
Start: 2025-02-19

## 2025-02-20 NOTE — PROGRESS NOTES
Subjective:       Patient ID: Shaka Hatch is a 69 y.o. male.    Chief Complaint: Results      HPI  Shaka Hatch is a 69 y.o. male with hyperlipidemia, hypothyroidism , vitamin-D deficiency, and found with a polyp at the appendiceal orifice and has decided to observe  who presents today for Results    Shaka presents today for follow up.    Blood pressure readings were 128/82 and 152/82. He denies checking blood pressure at home.  Denies headaches chest pains, palpitations, lightheadedness.    He walks 40-50 minutes nearly 6 times per week and performs stretching routines without experiencing palpitations, dizziness, or shortness of breath. He consumes two main meals daily: breakfast consisting of approximately one cup of oatmeal prepared with almond milk, occasionally with nuts and honey, and dinner around 6 PM consisting of fish with vegetables and rice noodles in moderate portions. He reports occasional late night snacking on nuts (particularly pistachios) and chocolate.    Has not seen gastroenterology your have colonoscopy for known polyp that decided to observe despite recommendations of surgery.  Denies abdominal pain, changes in bowel habits, or weight loss.    He reports back pain that was previously alleviated by therapeutic massages received in [facility] in December of last year.    He reports decreased vision, particularly while driving at night.  Has been eyeglasses and apparently starting with cataract that will be observed.    He has not engaged in meditation practice for over six months, despite having access to online meditation resources.    Has not no toxic habits.  He is retired.         Review of Systems   Constitutional:  Negative for activity change and unexpected weight change.   HENT:  Negative for hearing loss, rhinorrhea and trouble swallowing.    Eyes:  Negative for discharge and visual disturbance.   Respiratory:  Negative for chest tightness and wheezing.     Cardiovascular:  Negative for chest pain and palpitations.   Gastrointestinal:  Negative for blood in stool, constipation, diarrhea and vomiting.   Endocrine: Negative for polydipsia and polyuria.   Genitourinary:  Negative for difficulty urinating, hematuria and urgency.   Musculoskeletal:  Negative for arthralgias, joint swelling and neck pain.   Neurological:  Negative for weakness and headaches.   Psychiatric/Behavioral:  Negative for confusion and dysphoric mood.       Past Medical History:   Diagnosis Date    Hyperlipidemia     Serrated polyp of colon 06/16/2021    Thyroid disease     Vitamin D deficiency        Past Surgical History:   Procedure Laterality Date    COLONOSCOPY N/A 3/3/2021    Procedure: COLONOSCOPY suprep;  Surgeon: Evan Byers MD;  Location: [facility];  Service: Endoscopy;  Laterality: N/A;       Family History   Problem Relation Name Age of Onset    Diabetes Mellitus Mother      Hypertension Mother      Kidney cancer Mother      No Known Problems Father      Thyroid disease Sister      Chronic back pain Brother      Heart disease Maternal Grandmother         Social History     Socioeconomic History    Marital status: Single   Tobacco Use    Smoking status: Never    Smokeless tobacco: Never   Substance and Sexual Activity    Alcohol use: Not Currently     Alcohol/week: 3.0 standard drinks of alcohol     Types: 3 Glasses of wine per week     Comment: Drinks wine 2-3 times a week    Drug use: Never    Sexual activity: Yes     Partners: Female     Social Drivers of Health     Financial Resource Strain: Medium Risk (2/11/2025)    Overall Financial Resource Strain (CARDIA)     Difficulty of Paying Living Expenses: Somewhat hard   Food Insecurity: Food Insecurity Present (2/11/2025)    Hunger Vital Sign     Worried About Running Out of Food in the Last Year: Sometimes true     Ran Out of Food in the Last Year: Never true   Transportation Needs: No Transportation Needs (2/11/2025)    PRAPARE  "- Transportation     Lack of Transportation (Medical): No     Lack of Transportation (Non-Medical): No   Physical Activity: Sufficiently Active (2/11/2025)    Exercise Vital Sign     Days of Exercise per Week: 6 days     Minutes of Exercise per Session: 50 min   Stress: No Stress Concern Present (2/11/2025)    [employer] - Occupational Stress Questionnaire     Feeling of Stress : Not at all   Housing Stability: Low Risk  (2/11/2025)    Housing Stability Vital Sign     Unable to Pay for Housing in the Last Year: No     Homeless in the Last Year: No       Current Medications[1]    Review of patient's allergies indicates:  No Known Allergies      Objective:       Last 3 sets of Vitals        9/10/2024    11:15 AM 2/12/2025    10:44 AM 2/19/2025    10:18 AM   Vitals - 1 value per visit   SYSTOLIC 136 128 130   DIASTOLIC 82 82 86   Pulse 86 68 81   SPO2 97 % 97 % 97 %   Weight (lb) 173.61 174.27 174.05   Weight (kg) 78.75 79.05 78.95   Height 5' 6" (1.676 m) 5' 6" (1.676 m) 5' 6" (1.676 m)   BMI (Calculated) 28 28.1 28.1   Pain Score Zero Three Zero   Physical Exam  Constitutional:       General: He is not in acute distress.  HENT:      Head: Normocephalic.      Right Ear: External ear normal.      Left Ear: External ear normal.      Nose: Nose normal.      Mouth/Throat:      Mouth: Mucous membranes are moist.   Eyes:      General: No scleral icterus.     Extraocular Movements: Extraocular movements intact.      Conjunctiva/sclera: Conjunctivae normal.   Neck:      Vascular: No carotid bruit.      Comments: No goiter.  Cardiovascular:      Rate and Rhythm: Normal rate and regular rhythm.      Pulses: Normal pulses.      Heart sounds: Normal heart sounds.   Pulmonary:      Effort: Pulmonary effort is normal.      Breath sounds: Normal breath sounds.   Abdominal:      General: Bowel sounds are normal. There is no distension.      Palpations: Abdomen is soft. There is no mass.      Tenderness: " There is no abdominal tenderness.   Musculoskeletal:         General: No swelling.   Lymphadenopathy:      Cervical: No cervical adenopathy.   Skin:     General: Skin is warm and dry.   Neurological:      General: No focal deficit present.      Mental Status: He is alert and oriented to person, place, and time.   Psychiatric:         Mood and Affect: Mood normal.         Behavior: Behavior normal.           CBC:  Recent Labs   Lab 03/14/22  0842 06/07/23  0739 09/06/24  0817   WBC 5.53 6.41 6.61   RBC 4.97 4.82 4.67   Hemoglobin 15.5 15.1 14.8   Hematocrit 46.7 44.5 44.5   Platelets 265 244 248   MCV 94 92 95   MCH 31.2 H 31.3 H 31.7 H   MCHC 33.2 33.9 33.3     CMP:  Recent Labs   Lab 11/02/23  0806 09/06/24  0817 02/07/25  0807   Glucose 111 H 115 H 115 H   Calcium 9.5 9.5 9.5   Albumin 4.1 4.0 4.0   Total Protein 7.1 7.0 7.3   Sodium 141 140 139   Potassium 4.2 4.2 4.1   CO2 28 26 29   Chloride 103 104 103   BUN 9 9 8   Creatinine 0.8 0.8 0.8   Alkaline Phosphatase 52 L 65 66   ALT 21 48 H 36   AST 22 38 34   Total Bilirubin 0.8 0.7 0.8     URINALYSIS:       LIPIDS:  Recent Labs   Lab 11/02/23  0806 09/06/24  0817 02/07/25  0807   TSH 4.441 H 5.955 H 7.425 H   HDL 58 69 69   Cholesterol 192 227 H 205 H   Triglycerides 119 151 H 178 H   LDL Cholesterol 110.2 127.8 100.4   HDL/Cholesterol Ratio 30.2 30.4 33.7   Non-HDL Cholesterol 134 158 136   Total Cholesterol/HDL Ratio 3.3 3.3 3.0     TSH:  Recent Labs   Lab 11/02/23  0806 09/06/24  0817 02/07/25  0807   TSH 4.441 H 5.955 H 7.425 H       A1C:  Recent Labs   Lab 03/14/22  0842 04/04/23  0816 11/02/23  0806 09/06/24  0817 02/07/25  0807   Hemoglobin A1C 5.7 H 5.8 H 5.8 H 5.7 H 6.0 H       Imaging:  CT Abdomen Pelvis With Contrast  Narrative: EXAMINATION:  CT ABDOMEN PELVIS WITH CONTRAST    CLINICAL HISTORY:  Colon polyp, cancerous; Other intra-abdominal and pelvic swelling, mass and lump    TECHNIQUE:  Low dose axial images, sagittal and coronal reformations were  obtained from the lung bases to the pubic symphysis following the IV administration of 75 mL of Omnipaque 350 and the oral administration of 30 mL of Omnipaque 350.    COMPARISON:  None    FINDINGS:  Heart: Normal in size. No pericardial effusion.    Lung Bases: Bilateral dependent atelectasis.  Consolidation or pleural fluid.    Liver: Normal in size and overall attenuation.  There is a thin hyperdensity within the right subcapsular liver margin, likely relating to variable perfusion.    Gallbladder: No calcified gallstones.    Bile Ducts: No evidence of dilated ducts.    Pancreas: No mass or peripancreatic fat stranding.    Spleen: Small accessory spleen.    Adrenals: Unremarkable.    Kidneys/ Ureters: Normal size and location.  No hydroureteronephrosis.  No mass.    Bladder: No evidence of wall thickening.    Reproductive organs: Mild prostatomegaly with dystrophic calcification.    GI Tract/Mesentery: No evidence of bowel obstruction or inflammation.    Peritoneal Space: No ascites. No free air.    Retroperitoneum: No significant adenopathy.    Abdominal wall: Unremarkable.    Vasculature: No significant atherosclerosis or aneurysm.    Bones: Degenerative changes of the spine.  Sclerotic focus of the left lateral T9 vertebral body, likely relating to bone island..  No acute fracture.  Impression: Thin hyperdensity within the right subcapsular liver margin, likely relating to variable perfusion or small hemangioma.    Mild prostatomegaly with dystrophic calcification.    Additional findings above.    Electronically signed by resident: Bárbara Salazar  Date:    04/08/2022  Time:    15:16    Electronically signed by: Stephen Santo MD  Date:    04/08/2022  Time:    15:50      Assessment:       1. Hypothyroidism due to acquired atrophy of thyroid    2. Mixed hyperlipidemia    3. Diastolic blood pressure 80 to 89 mm Hg    4. Prediabetes    5. Healthcare maintenance    6. Serrated polyp of colon    7. Colon cancer  screening            Plan:       1. Hypothyroidism due to acquired atrophy of thyroid  Overview:  On levothyroxine 75 mcg daily    Assessment & Plan:  - Monitored the patient's thyroid function.  - TSH level is 7.4, indicating slow thyroid function.  - Evaluated T4 levels, which are normal.  - Shaka denies significant thyroid symptoms.  - Considered adjusting thyroid medication dosage to optimize thyroid function and potentially help with weight management.  - Continued thyroid medication at current dosage, taken on an empty stomach early in the morning.  - Will consider increasing the dosage at next follow-up.    Orders:  -     levothyroxine (SYNTHROID) 75 MCG tablet; Take 1 tablet (75 mcg total) by mouth before breakfast.  Dispense: 90 tablet; Refill: 1  -     TSH; Future; Expected date: 02/19/2025  -     T4, Free; Future; Expected date: 02/19/2025  -     CBC Auto Differential; Future; Expected date: 02/19/2025    2. Mixed hyperlipidemia  Overview:  Atorvastatin  will be increased to 20 mg daily.    Assessment & Plan:  - Monitored the patient's cholesterol levels.  - LDL is currently at 100.  - Evaluated HDL (good cholesterol), which is very good.  - LDL (bad cholesterol) is at 100, which is still higher than the target of 70.  - Explained the relationship between LDL cholesterol levels and cardiovascular risk, emphasizing the goal to lower LDL to 70.  - Continued statin (cholesterol medication) at 20mg dosage.  - Suggested increasing the dosage to reach the target LDL of 70, but agreed to wait and reassess after addressing thyroid issues.    Orders:  -     atorvastatin (LIPITOR) 20 MG tablet; Take 1 tablet (20 mg total) by mouth every evening.  Dispense: 90 tablet; Refill: 1  -     Lipid Panel; Future; Expected date: 02/19/2025  -     Comprehensive Metabolic Panel; Future; Expected date: 02/19/2025    3. Diastolic blood pressure 80 to 89 mm Hg  Assessment & Plan:  - optimize diet and exercise.  Weight loss will  help.  - monitor blood pressure more frequently at the pharmacy.      4. Prediabetes  Assessment & Plan:  - Monitored the patient's fasting blood sugar, which is elevated to prediabetes level.  - HbA1c has increased to 6.0.  - Evaluated the patient's HbA1c of 6.0, which is in the prediabetes range but not yet diabetes.  - Discussed prediabetes and its management through diet and exercise.  - Recommend dietary changes to manage blood sugar, including reducing carbohydrate intake and choosing low glycemic index foods.  - Considered Metformin as a treatment option if lifestyle changes are not sufficient, but the patient prefers to try non-medication approaches first.  - Advised continuing with diet modifications and exercise to manage prediabetes.      Orders:  -     Comprehensive Metabolic Panel; Future; Expected date: 02/19/2025  -     Hemoglobin A1C; Future; Expected date: 02/19/2025    5. Healthcare maintenance  Assessment & Plan:  - Yearly labs- ordered today  - Colon cancer screen- 3/3/2021, repeat in 3 years- a polyp in appendiceal orifice was noted.  - PSA- 6/7/23, 3.6.  - Vaccines- COVID booster, Flu, Pneumonia, RSV, and Shingles- declined  - ACP- HPOA  Was completed on 09/10/2024.  Will think about living will.      6. Serrated polyp of colon  Overview:  Colonoscopy on 4/28/21   - Two 5 to 8 mm polyps in the ascending colon, removed with a cold snare. Resected and retrieved.   - The examined portion of the ileum was normal.   - One 15 mm polyp at the appendiceal orifice. Resection not attempted as part of the polyp extending to appendix. Biopsied.     Pathology:  1.  APPENDICEAL ORIFICE, BIOPSY:   - Sessile serrated polyp   - No evidence of dysplasia or malignancy (multiple deeper levels examined)   - See comments   2.  COLON, ASCENDING, BIOPSY:   - Tubular adenomas, fragmented     *Pathology did not show malignancy. It has been recommended by all physicians to consider resection but patient feels is not  necessary yet as there was no malignancy. He would consider a less invasive procedure that he found on case studies on the Internet which are done endoscopically but not done in this institution. Preferred to wait.    Assessment & Plan:  - Ordered a colonoscopy due to the patient being overdue for screening.      Orders:  -     Ambulatory referral/consult to Gastroenterology; Future; Expected date: 02/19/2025    7. Colon cancer screening  -     Ambulatory referral/consult to Endo Procedure ; Future; Expected date: 02/20/2025    Other orders  -     ketoconazole (NIZORAL) 2 % cream; Apply topically once daily.  Dispense: 30 g; Refill: 6       Assessment & Plan    > Assessed blood pressure: patient has prehypertension or elevated diastolic BP  > Evaluated thyroid function: TSH elevated at 7.4, indicating slow thyroid; considering increasing thyroid medication dosage  > Reviewed cholesterol levels: LDL still elevated at 100, considering increasing statin dosage  > Assessed prediabetes risk: A1c at 6.0, indicating prediabetes; recommending lifestyle modifications before considering medication  > Evaluated weight gain: slight increase noted, likely due to muscle loss  > Considered options for managing prediabetes, including potential use of Metformin if lifestyle changes ineffective  > Discussed management of mild back pain: considered recommending chiropractor for lower back, avoiding neck manipulations      WEIGHT MANAGEMENT:  - Monitored the patient's weight, which has increased by 1 pound since the last visit, and about 4 lbs over the past year.  - Evaluated the patient's weight status as overweight, not obese.  - Noted that weight gain could be contributing to rising blood sugar.  - Discussed the patient's diet, noting that   it generally seems good but suggested some modifications to help with weight management.  - Recommend increasing resistance exercises to help with weight management and muscle  maintenance.  - Emphasized the importance of resistance exercises for maintaining muscle mass and weight management.  - Shaka to increase resistance exercises to help maintain muscle mass and manage weight.  - Shaka to continue current walking routine of 40-50 minutes, 6 times per week.  - Recommend modifying diet: - Choose low glycemic index vegetables       - Shaka to resume meditation practice to manage stress.    FOLLOW UP:  - Follow up in 3 months.  - Contact office if any concerns arise before next appointment.        Health Maintenance Due   Topic Date Due    Colorectal Cancer Screening  03/03/2024        I spent a total of 40 minutes on the day of the visit.This includes face to face time and non-face to face time preparing to see the patient (eg, review of tests), obtaining and/or reviewing separately obtained history, documenting clinical information in the electronic or other health record, independently interpreting results and communicating results to the patient/family/caregiver, or care coordinator.     RETURN TO CLINIC IN: 3 MONTHS    FOR NEXT VISIT: BLOOD PRESSURE MONITORING, REVIEW LABS, MEDICATION MONITORING, and CARE GAPS       Valencia Jurado MD  [facility]  Disclaimer:  This note has been generated using voice-recognition software. There may be grammatical or spelling errors that have been missed during proof-reading           [1]   Current Outpatient Medications   Medication Sig Dispense Refill    atorvastatin (LIPITOR) 20 MG tablet Take 1 tablet (20 mg total) by mouth every evening. 90 tablet 1    ketoconazole (NIZORAL) 2 % cream Apply topically once daily. 30 g 6    levothyroxine (SYNTHROID) 75 MCG tablet Take 1 tablet (75 mcg total) by mouth before breakfast. 90 tablet 1     No current facility-administered medications for this visit.     Facility-Administered Medications Ordered in Other Visits   Medication Dose Route Frequency Provider Last Rate Last Admin    0.9%  NaCl infusion    Intravenous Continuous Evan Byers MD 20 mL/hr at 03/03/21 1232 New Bag at 03/03/21 1232    sodium chloride 0.9% flush 10 mL  10 mL Intravenous PRN Evan Byers MD

## 2025-02-20 NOTE — ASSESSMENT & PLAN NOTE
- optimize diet and exercise.  Weight loss will help.  - monitor blood pressure more frequently at the pharmacy.

## 2025-02-20 NOTE — ASSESSMENT & PLAN NOTE
- Monitored the patient's cholesterol levels.  - LDL is currently at 100.  - Evaluated HDL (good cholesterol), which is very good.  - LDL (bad cholesterol) is at 100, which is still higher than the target of 70.  - Explained the relationship between LDL cholesterol levels and cardiovascular risk, emphasizing the goal to lower LDL to 70.  - Continued statin (cholesterol medication) at 20mg dosage.  - Suggested increasing the dosage to reach the target LDL of 70, but agreed to wait and reassess after addressing thyroid issues.

## 2025-02-20 NOTE — ASSESSMENT & PLAN NOTE
- Monitored the patient's thyroid function.  - TSH level is 7.4, indicating slow thyroid function.  - Evaluated T4 levels, which are normal.  - Shaka denies significant thyroid symptoms.  - Considered adjusting thyroid medication dosage to optimize thyroid function and potentially help with weight management.  - Continued thyroid medication at current dosage, taken on an empty stomach early in the morning.  - Will consider increasing the dosage at next follow-up.

## 2025-02-20 NOTE — ASSESSMENT & PLAN NOTE
- Monitored the patient's fasting blood sugar, which is elevated to prediabetes level.  - HbA1c has increased to 6.0.  - Evaluated the patient's HbA1c of 6.0, which is in the prediabetes range but not yet diabetes.  - Discussed prediabetes and its management through diet and exercise.  - Recommend dietary changes to manage blood sugar, including reducing carbohydrate intake and choosing low glycemic index foods.  - Considered Metformin as a treatment option if lifestyle changes are not sufficient, but the patient prefers to try non-medication approaches first.  - Advised continuing with diet modifications and exercise to manage prediabetes.

## 2025-07-01 ENCOUNTER — TELEPHONE (OUTPATIENT)
Dept: ENDOSCOPY | Facility: HOSPITAL | Age: 70
End: 2025-07-01
Payer: MEDICARE

## 2025-08-14 ENCOUNTER — PATIENT MESSAGE (OUTPATIENT)
Dept: PRIMARY CARE CLINIC | Facility: CLINIC | Age: 70
End: 2025-08-14
Payer: MEDICARE

## 2025-08-18 ENCOUNTER — LAB VISIT (OUTPATIENT)
Dept: LAB | Facility: HOSPITAL | Age: 70
End: 2025-08-18
Attending: INTERNAL MEDICINE
Payer: MEDICARE

## 2025-08-18 DIAGNOSIS — E78.2 MIXED HYPERLIPIDEMIA: ICD-10-CM

## 2025-08-18 DIAGNOSIS — R73.03 PREDIABETES: ICD-10-CM

## 2025-08-18 DIAGNOSIS — E03.4 HYPOTHYROIDISM DUE TO ACQUIRED ATROPHY OF THYROID: ICD-10-CM

## 2025-08-18 LAB
ABSOLUTE EOSINOPHIL (OHS): 0.11 K/UL
ABSOLUTE MONOCYTE (OHS): 0.73 K/UL (ref 0.3–1)
ABSOLUTE NEUTROPHIL COUNT (OHS): 2.74 K/UL (ref 1.8–7.7)
ALBUMIN SERPL BCP-MCNC: 4.1 G/DL (ref 3.5–5.2)
ALP SERPL-CCNC: 55 UNIT/L (ref 40–150)
ALT SERPL W/O P-5'-P-CCNC: 28 UNIT/L (ref 0–55)
ANION GAP (OHS): 10 MMOL/L (ref 8–16)
AST SERPL-CCNC: 33 UNIT/L (ref 0–50)
BASOPHILS # BLD AUTO: 0.05 K/UL
BASOPHILS NFR BLD AUTO: 0.8 %
BILIRUB SERPL-MCNC: 0.6 MG/DL (ref 0.1–1)
BUN SERPL-MCNC: 10 MG/DL (ref 8–23)
CALCIUM SERPL-MCNC: 9.4 MG/DL (ref 8.7–10.5)
CHLORIDE SERPL-SCNC: 104 MMOL/L (ref 95–110)
CHOLEST SERPL-MCNC: 291 MG/DL (ref 120–199)
CHOLEST/HDLC SERPL: 5.1 {RATIO} (ref 2–5)
CO2 SERPL-SCNC: 27 MMOL/L (ref 23–29)
CREAT SERPL-MCNC: 0.8 MG/DL (ref 0.5–1.4)
EAG (OHS): 123 MG/DL (ref 68–131)
ERYTHROCYTE [DISTWIDTH] IN BLOOD BY AUTOMATED COUNT: 13.2 % (ref 11.5–14.5)
GFR SERPLBLD CREATININE-BSD FMLA CKD-EPI: >60 ML/MIN/1.73/M2
GLUCOSE SERPL-MCNC: 106 MG/DL (ref 70–110)
HBA1C MFR BLD: 5.9 % (ref 4–5.6)
HCT VFR BLD AUTO: 44.2 % (ref 40–54)
HDLC SERPL-MCNC: 57 MG/DL (ref 40–75)
HDLC SERPL: 19.6 % (ref 20–50)
HGB BLD-MCNC: 14.6 GM/DL (ref 14–18)
IMM GRANULOCYTES # BLD AUTO: 0.01 K/UL (ref 0–0.04)
IMM GRANULOCYTES NFR BLD AUTO: 0.2 % (ref 0–0.5)
LDLC SERPL CALC-MCNC: 196.2 MG/DL (ref 63–159)
LYMPHOCYTES # BLD AUTO: 2.33 K/UL (ref 1–4.8)
MCH RBC QN AUTO: 31.8 PG (ref 27–31)
MCHC RBC AUTO-ENTMCNC: 33 G/DL (ref 32–36)
MCV RBC AUTO: 96 FL (ref 82–98)
NONHDLC SERPL-MCNC: 234 MG/DL
NUCLEATED RBC (/100WBC) (OHS): 0 /100 WBC
PLATELET # BLD AUTO: 258 K/UL (ref 150–450)
PMV BLD AUTO: 9.9 FL (ref 9.2–12.9)
POTASSIUM SERPL-SCNC: 4.2 MMOL/L (ref 3.5–5.1)
PROT SERPL-MCNC: 7 GM/DL (ref 6–8.4)
RBC # BLD AUTO: 4.59 M/UL (ref 4.6–6.2)
RELATIVE EOSINOPHIL (OHS): 1.8 %
RELATIVE LYMPHOCYTE (OHS): 39 % (ref 18–48)
RELATIVE MONOCYTE (OHS): 12.2 % (ref 4–15)
RELATIVE NEUTROPHIL (OHS): 46 % (ref 38–73)
SODIUM SERPL-SCNC: 141 MMOL/L (ref 136–145)
T4 FREE SERPL-MCNC: 0.94 NG/DL (ref 0.71–1.51)
TRIGL SERPL-MCNC: 189 MG/DL (ref 30–150)
TSH SERPL-ACNC: 10.07 UIU/ML (ref 0.4–4)
WBC # BLD AUTO: 5.97 K/UL (ref 3.9–12.7)

## 2025-08-18 PROCEDURE — 83036 HEMOGLOBIN GLYCOSYLATED A1C: CPT | Mod: HCNC

## 2025-08-18 PROCEDURE — 84443 ASSAY THYROID STIM HORMONE: CPT | Mod: HCNC

## 2025-08-18 PROCEDURE — 83718 ASSAY OF LIPOPROTEIN: CPT | Mod: HCNC

## 2025-08-18 PROCEDURE — 36415 COLL VENOUS BLD VENIPUNCTURE: CPT | Mod: HCNC

## 2025-08-18 PROCEDURE — 82310 ASSAY OF CALCIUM: CPT | Mod: HCNC

## 2025-08-18 PROCEDURE — 85025 COMPLETE CBC W/AUTO DIFF WBC: CPT | Mod: HCNC

## 2025-08-18 PROCEDURE — 84439 ASSAY OF FREE THYROXINE: CPT | Mod: HCNC

## 2025-08-19 ENCOUNTER — TELEPHONE (OUTPATIENT)
Dept: ENDOSCOPY | Facility: HOSPITAL | Age: 70
End: 2025-08-19
Payer: MEDICARE

## 2025-08-25 ENCOUNTER — OFFICE VISIT (OUTPATIENT)
Dept: PRIMARY CARE CLINIC | Facility: CLINIC | Age: 70
End: 2025-08-25
Payer: MEDICARE

## 2025-08-25 VITALS
HEART RATE: 70 BPM | DIASTOLIC BLOOD PRESSURE: 72 MMHG | SYSTOLIC BLOOD PRESSURE: 110 MMHG | WEIGHT: 171.31 LBS | BODY MASS INDEX: 27.53 KG/M2 | RESPIRATION RATE: 18 BRPM | HEIGHT: 66 IN

## 2025-08-25 DIAGNOSIS — R73.03 PREDIABETES: ICD-10-CM

## 2025-08-25 DIAGNOSIS — E03.4 HYPOTHYROIDISM DUE TO ACQUIRED ATROPHY OF THYROID: Primary | ICD-10-CM

## 2025-08-25 DIAGNOSIS — E78.2 MIXED HYPERLIPIDEMIA: ICD-10-CM

## 2025-08-25 PROCEDURE — 1126F AMNT PAIN NOTED NONE PRSNT: CPT | Mod: CPTII,HCNC,S$GLB, | Performed by: PHYSICIAN ASSISTANT

## 2025-08-25 PROCEDURE — 3288F FALL RISK ASSESSMENT DOCD: CPT | Mod: CPTII,HCNC,S$GLB, | Performed by: PHYSICIAN ASSISTANT

## 2025-08-25 PROCEDURE — 1159F MED LIST DOCD IN RCRD: CPT | Mod: CPTII,HCNC,S$GLB, | Performed by: PHYSICIAN ASSISTANT

## 2025-08-25 PROCEDURE — 1157F ADVNC CARE PLAN IN RCRD: CPT | Mod: CPTII,HCNC,S$GLB, | Performed by: PHYSICIAN ASSISTANT

## 2025-08-25 PROCEDURE — 99999 PR PBB SHADOW E&M-EST. PATIENT-LVL III: CPT | Mod: PBBFAC,HCNC,, | Performed by: PHYSICIAN ASSISTANT

## 2025-08-25 PROCEDURE — 99214 OFFICE O/P EST MOD 30 MIN: CPT | Mod: HCNC,S$GLB,, | Performed by: PHYSICIAN ASSISTANT

## 2025-08-25 PROCEDURE — 3008F BODY MASS INDEX DOCD: CPT | Mod: CPTII,HCNC,S$GLB, | Performed by: PHYSICIAN ASSISTANT

## 2025-08-25 PROCEDURE — 1101F PT FALLS ASSESS-DOCD LE1/YR: CPT | Mod: CPTII,HCNC,S$GLB, | Performed by: PHYSICIAN ASSISTANT

## 2025-08-25 PROCEDURE — 1160F RVW MEDS BY RX/DR IN RCRD: CPT | Mod: CPTII,HCNC,S$GLB, | Performed by: PHYSICIAN ASSISTANT

## 2025-08-25 PROCEDURE — 3044F HG A1C LEVEL LT 7.0%: CPT | Mod: CPTII,HCNC,S$GLB, | Performed by: PHYSICIAN ASSISTANT

## 2025-08-25 PROCEDURE — 3078F DIAST BP <80 MM HG: CPT | Mod: CPTII,HCNC,S$GLB, | Performed by: PHYSICIAN ASSISTANT

## 2025-08-25 PROCEDURE — 3074F SYST BP LT 130 MM HG: CPT | Mod: CPTII,HCNC,S$GLB, | Performed by: PHYSICIAN ASSISTANT

## 2025-08-25 RX ORDER — LEVOTHYROXINE SODIUM 75 UG/1
75 TABLET ORAL
Qty: 90 TABLET | Refills: 3 | Status: SHIPPED | OUTPATIENT
Start: 2025-08-25

## 2025-08-25 RX ORDER — ATORVASTATIN CALCIUM 20 MG/1
20 TABLET, FILM COATED ORAL NIGHTLY
Qty: 90 TABLET | Refills: 3 | Status: SHIPPED | OUTPATIENT
Start: 2025-08-25

## 2025-08-25 RX ORDER — KETOCONAZOLE 20 MG/G
CREAM TOPICAL DAILY
Qty: 30 G | Refills: 6 | Status: SHIPPED | OUTPATIENT
Start: 2025-08-25

## 2025-08-28 ENCOUNTER — TELEPHONE (OUTPATIENT)
Dept: ENDOSCOPY | Facility: HOSPITAL | Age: 70
End: 2025-08-28
Payer: MEDICARE

## 2025-09-03 ENCOUNTER — PATIENT MESSAGE (OUTPATIENT)
Dept: PRIMARY CARE CLINIC | Facility: CLINIC | Age: 70
End: 2025-09-03
Payer: MEDICARE